# Patient Record
Sex: MALE | Race: WHITE | Employment: OTHER | URBAN - METROPOLITAN AREA
[De-identification: names, ages, dates, MRNs, and addresses within clinical notes are randomized per-mention and may not be internally consistent; named-entity substitution may affect disease eponyms.]

---

## 2017-06-03 PROBLEM — R07.9 CHEST PAIN: Status: ACTIVE | Noted: 2017-06-03

## 2017-11-14 ENCOUNTER — OFFICE VISIT (OUTPATIENT)
Dept: CARDIOLOGY CLINIC | Age: 82
End: 2017-11-14

## 2017-11-14 VITALS
WEIGHT: 219.4 LBS | DIASTOLIC BLOOD PRESSURE: 62 MMHG | HEART RATE: 68 BPM | HEIGHT: 65 IN | BODY MASS INDEX: 36.55 KG/M2 | SYSTOLIC BLOOD PRESSURE: 108 MMHG | OXYGEN SATURATION: 98 %

## 2017-11-14 DIAGNOSIS — I25.10 CORONARY ARTERY DISEASE INVOLVING NATIVE CORONARY ARTERY OF NATIVE HEART, ANGINA PRESENCE UNSPECIFIED: Primary | ICD-10-CM

## 2017-11-14 DIAGNOSIS — I10 ESSENTIAL HYPERTENSION: ICD-10-CM

## 2017-11-14 DIAGNOSIS — I51.89 DIASTOLIC DYSFUNCTION: ICD-10-CM

## 2017-11-14 PROCEDURE — G8484 FLU IMMUNIZE NO ADMIN: HCPCS | Performed by: INTERNAL MEDICINE

## 2017-11-14 PROCEDURE — G8598 ASA/ANTIPLAT THER USED: HCPCS | Performed by: INTERNAL MEDICINE

## 2017-11-14 PROCEDURE — G8417 CALC BMI ABV UP PARAM F/U: HCPCS | Performed by: INTERNAL MEDICINE

## 2017-11-14 PROCEDURE — 1123F ACP DISCUSS/DSCN MKR DOCD: CPT | Performed by: INTERNAL MEDICINE

## 2017-11-14 PROCEDURE — 1036F TOBACCO NON-USER: CPT | Performed by: INTERNAL MEDICINE

## 2017-11-14 PROCEDURE — 4040F PNEUMOC VAC/ADMIN/RCVD: CPT | Performed by: INTERNAL MEDICINE

## 2017-11-14 PROCEDURE — G8427 DOCREV CUR MEDS BY ELIG CLIN: HCPCS | Performed by: INTERNAL MEDICINE

## 2017-11-14 PROCEDURE — 99213 OFFICE O/P EST LOW 20 MIN: CPT | Performed by: INTERNAL MEDICINE

## 2017-11-14 RX ORDER — INSULIN GLARGINE 100 [IU]/ML
8 INJECTION, SOLUTION SUBCUTANEOUS NIGHTLY
COMMUNITY
End: 2020-01-01

## 2017-11-14 RX ORDER — FLUTICASONE PROPIONATE 50 MCG
1 SPRAY, SUSPENSION (ML) NASAL DAILY
COMMUNITY
End: 2020-01-01

## 2017-11-14 RX ORDER — BENZONATATE 100 MG/1
100 CAPSULE ORAL 3 TIMES DAILY PRN
COMMUNITY
End: 2019-06-19 | Stop reason: ALTCHOICE

## 2017-11-14 RX ORDER — CETIRIZINE HYDROCHLORIDE 10 MG/1
10 TABLET ORAL DAILY
COMMUNITY
End: 2020-01-01

## 2017-11-14 NOTE — PROGRESS NOTES
[address] [facility]   Cardiovascular follow up     PATIENT: Halie Guzman  DATE: 2017  MRN: [id number]  CSN: [id number]  : 1926      Primary Care Doctor: Nikunj Thompson MD  Reason for evaluation:   1 Year Follow Up and Shortness of Breath      Subjective:   History of present illness on initial date of evaluation:   Halie Guzman is a 80 y.o. patient who presents for hospital follow up. Recently presented to the ER on 03/10/2015 with complaints of fatigue. Pt with abdomainal distention and trouble breathing at time of admission. Patient underwent NM stress testing that showed mild ischemia with EF of 59% (low risk probability) and medically managing patient at that time . He did have one isolated incident of NSVT, his metoprolol was increased at that time to 25 mg twice daily. On follow up 4/7/15 he denied any further episodes of felt palpitations. Since that time he reported to having unchanged chest pressure in the mid sternal area that comes and goes. Chest pain was non exertional. Pain lasted only seconds and resolved quickly on its own. Remained greatly fatigued. He was currently residing in the [place] rehab facility. Family reported patient is not very active at all. Has had decreased appetite. He is unable to lie flat on his back. On 04/22/15 he is here for follow up s/p cardiac cath on 4/8/15. Cath showed nonobstructive CAD and Cardiac Syndrome X/Endothelial dysfunction. He comes to the office with his daughter in a wheelchair. He is tolerating his medications. He denies chest pain, shortness of breath, edema, dizziness, palpitations and syncope. Today he state she has been feeling well. He has daily weights that have been stabel. He states he had been sick, but is feeling well now. He had not been eating well. He is tolerating his medications. He denies CP, SOB, dizziness or syncope. He is npt active and uses a wheelchair. His lab work shows his A1c is 11.1.  Blood pressure recheck was 108/62. Patient Active Problem List   Diagnosis    CAD (coronary artery disease)    Weakness    HTN (hypertension)    Ataxia    Patient overweight    Benign prostatic hyperplasia with urinary obstruction    Insomnia    Anxiety    Chronic back pain    Hypotension    [facility]acute [facility] failure[facility])    Abdominal distension    SOB (shortness of breath)    NSVT (nonsustained ventricular tachycardia) ([facility])    Unstable angina ([facility])    Diastolic dysfunction    Chest pain         Past Medical History:   has a past medical history of Allergic rhinitis; Anxiety; BPH with obstruction/lower urinary tract symptoms; CAD (coronary artery disease); Chronic back pain; Diabetes mellitus ([address] GERD (gastroesophageal reflux disease); Hyperlipidemia; Hypertension; Lumbar disc disease; Lumbar spinal stenosis; Pneumonia; and Skin cancer. Surgical History:   has a past surgical history that includes knee surgery; shoulder surgery; Cataract removal; and other surgical history (Right, 10/25/2016). Social History:   reports that he has never smoked. He has never used smokeless tobacco. He reports that he does not drink alcohol or use drugs. Family History:  No evidence for sudden cardiac death or premature CAD    Home Medications:  Reviewed and are listed in nursing record.  and/or listed below  Current Outpatient Prescriptions   Medication Sig Dispense Refill    cetirizine (ZYRTEC) 10 MG tablet Take 10 mg by mouth daily      fluticasone (FLONASE) 50 MCG/ACT nasal spray 1 spray by Nasal route daily      insulin glargine (LANTUS) 100 UNIT/ML injection vial Inject into the skin nightly      insulin lispro (HUMALOG) 100 UNIT/ML injection cartridge Inject into the skin      ALBUTEROL SULFATE IN Inhale into the lungs      benzonatate (TESSALON) 100 MG capsule Take 100 mg by mouth 3 times daily as needed for Cough      ipratropium (ATROVENT) 0.02 % nebulizer solution Take 0.5 mg by nebulization 4 times daily      finasteride (PROSCAR) 5 MG tablet Take 1 tablet by mouth daily 30 tablet 0    Cyanocobalamin (VITAMIN B 12 PO) Take by mouth      glipiZIDE (GLUCOTROL) 5 MG tablet Take 5 mg by mouth 2 times daily (before meals)      metFORMIN (GLUCOPHAGE) 500 MG tablet Take 500 mg by mouth 2 times daily (with meals)       furosemide (LASIX) 40 MG tablet Take 1 tablet by mouth 2 times daily Take 40 mg in the am and 20 mg in the pm (Patient taking differently: Take 40 mg by mouth 2 times daily ) 60 tablet 3    metoprolol (LOPRESSOR) 25 MG tablet Take 0.5 tablets by mouth 2 times daily 60 tablet 3    potassium chloride SA (K-DUR;KLOR-CON M) 10 MEQ tablet Take 1 tablet by mouth daily (Patient taking differently: Take 20 mEq by mouth daily ) 60 tablet 11    isosorbide mononitrate (IMDUR) 30 MG CR tablet Take 2 tablets by mouth daily. 30 tablet 2    pravastatin (PRAVACHOL) 80 MG tablet Take 1 tablet by mouth daily. 30 tablet 3    polyethylene glycol (MIRALAX) powder Take 17 g by mouth daily. 238 g 0    aspirin 81 MG EC tablet Take 81 mg by mouth daily.  acetaminophen (TYLENOL) 325 MG tablet Take 650 mg by mouth every 6 hours as needed for Pain 2 tablets at bedtime      ranitidine (ZANTAC) 150 MG capsule Take 150 mg by mouth 2 times daily as needed       terazosin (HYTRIN) 10 MG capsule TAKE ONE CAPSULE BY MOUTH EVERY DAY 30 capsule 2    Multiple Vitamin (MULTIVITAMIN PO) Take  by mouth. Indications: OTC      lidocaine (LIDODERM) 5 % Place 1 patch onto the skin daily 12 hours on, 12 hours off. 10 patch 0    sennosides-docusate sodium (SENOKOT-S) 8.6-50 MG tablet Take 2 tablets by mouth daily 10 tablet 0     No current facility-administered medications for this visit. Allergies:  Penicillins     Review of Systems:   All 12 point review of symptoms completed. Pertinent positives identified in the HPI, all other review of symptoms negative as below.     Objective:   PHYSICAL EXAM: Vitals:    11/14/17 1023   BP: 92/62   Pulse: 68   SpO2: 98%    Weight: 219 lb 6.4 oz (99.5 kg)     Wt Readings from Last 3 Encounters:   11/14/17 219 lb 6.4 oz (99.5 kg)   06/04/17 223 lb 12.3 oz (101.5 kg)   06/03/17 200 lb (90.7 kg)         General Appearance:  Alert, cooperative, no distress, appears stated age, obese   Head:  Normocephalic, atraumatic   Eyes:  PERRL, conjunctiva/corneas clear   Nose: Nares normal, no drainage or sinus tenderness   Throat: Lips, mucosa, and tongue normal   Neck: Supple, symmetrical, trachea midline, NL thyroid no carotid bruit or JVD   Lungs:   Crackles in left lung to mid, respirations unlabored   Chest Wall:  No tenderness or deformity   Heart:  Regular rhythm and normal rate; S1, S2 are normal;   no murmur noted; no rub or gallop   Abdomen:   Soft, non-tender, +BS x 4, no masses, no organomegaly   Extremities: Extremities normal, atraumatic, no cyanosis.  Trace -1+ BLE pitting edema (R>L)   Pulses: 2+ and symmetric   Skin: Skin color, texture, turgor normal, no rashes or lesions   Pysch: Normal mood and affect   Neurologic: Normal gross motor and sensory exam.         LABS   CBC:      Lab Results   Component Value Date    WBC 12.4 06/04/2017    RBC 3.89 06/04/2017    HGB 12.0 06/04/2017    HCT 36.5 06/04/2017    MCV 93.9 06/04/2017    RDW 13.6 06/04/2017     06/04/2017     CMP:  Lab Results   Component Value Date     06/04/2017    K 3.7 06/04/2017    CL 93 06/04/2017    CO2 26 06/04/2017    BUN 20 06/04/2017    CREATININE 1.2 06/04/2017    GFRAA >60 06/04/2017    GFRAA >60 05/07/2012    AGRATIO 1.4 06/03/2017    LABGLOM 57 06/04/2017    GLUCOSE 215 06/04/2017    PROT 6.1 06/04/2017    PROT 6.8 05/03/2012    CALCIUM 9.2 06/04/2017    BILITOT 0.6 06/04/2017    ALKPHOS 62 06/04/2017    AST 22 06/04/2017    ALT 41 06/04/2017     PT/INR:   No components found for: PTPATIENT,  PTINR  Liver:  No components found for: CHLPL  Lab Results   Component Value Date    ALT 41

## 2017-11-14 NOTE — PATIENT INSTRUCTIONS
Plan:  1. No changes at this time   2. Stay as active as possible   3. Hold lasix on days that you are sick or are not taking enough fluids in   4.  Follow up in 6 months with NP

## 2018-02-13 LAB
ALBUMIN SERPL-MCNC: NORMAL G/DL
ALP BLD-CCNC: NORMAL U/L
ALT SERPL-CCNC: NORMAL U/L
ANION GAP SERPL CALCULATED.3IONS-SCNC: NORMAL MMOL/L
AST SERPL-CCNC: NORMAL U/L
BASOPHILS ABSOLUTE: 0.1 /ΜL
BASOPHILS RELATIVE PERCENT: 0.4 %
BILIRUB SERPL-MCNC: NORMAL MG/DL (ref 0.1–1.4)
BUN BLDV-MCNC: 29 MG/DL
CALCIUM SERPL-MCNC: 9 MG/DL
CHLORIDE BLD-SCNC: 98 MMOL/L
CHOLESTEROL, TOTAL: 145 MG/DL
CHOLESTEROL/HDL RATIO: 2.2
CO2: 27 MMOL/L
CREAT SERPL-MCNC: 1.2 MG/DL
EOSINOPHILS ABSOLUTE: 0.3 /ΜL
EOSINOPHILS RELATIVE PERCENT: 2.1 %
GFR CALCULATED: 57
GLUCOSE BLD-MCNC: 123 MG/DL
HCT VFR BLD CALC: 41.6 % (ref 41–53)
HDLC SERPL-MCNC: 34 MG/DL (ref 35–70)
HEMOGLOBIN: 13.5 G/DL (ref 13.5–17.5)
LDL CHOLESTEROL CALCULATED: 76 MG/DL (ref 0–160)
LYMPHOCYTES ABSOLUTE: 2.1 /ΜL
LYMPHOCYTES RELATIVE PERCENT: 14.5 %
MCH RBC QN AUTO: 29.8 PG
MCHC RBC AUTO-ENTMCNC: 32.6 G/DL
MCV RBC AUTO: 91.6 FL
MONOCYTES ABSOLUTE: 1.2 /ΜL
MONOCYTES RELATIVE PERCENT: 7.8 %
NEUTROPHILS ABSOLUTE: 11.1 /ΜL
NEUTROPHILS RELATIVE PERCENT: 75.2 %
PLATELET # BLD: 341 K/ΜL
PMV BLD AUTO: 8.1 FL
POTASSIUM SERPL-SCNC: NORMAL MMOL/L
RBC # BLD: 4.54 10^6/ΜL
SODIUM BLD-SCNC: 139 MMOL/L
TOTAL PROTEIN: NORMAL
TRIGL SERPL-MCNC: 175 MG/DL
VLDLC SERPL CALC-MCNC: 35 MG/DL
WBC # BLD: 14.7 10^3/ML

## 2018-04-03 LAB
BUN BLDV-MCNC: 20 MG/DL
CALCIUM SERPL-MCNC: 8.9 MG/DL
CHLORIDE BLD-SCNC: 100 MMOL/L
CO2: 30 MMOL/L
CREAT SERPL-MCNC: 1.1 MG/DL
GFR CALCULATED: 63
GLUCOSE BLD-MCNC: 121 MG/DL
POTASSIUM SERPL-SCNC: 3.9 MMOL/L
SODIUM BLD-SCNC: 137 MMOL/L

## 2018-06-14 ENCOUNTER — OFFICE VISIT (OUTPATIENT)
Dept: CARDIOLOGY CLINIC | Age: 83
End: 2018-06-14

## 2018-06-14 VITALS
HEART RATE: 84 BPM | SYSTOLIC BLOOD PRESSURE: 138 MMHG | DIASTOLIC BLOOD PRESSURE: 77 MMHG | HEIGHT: 62 IN | RESPIRATION RATE: 16 BRPM | OXYGEN SATURATION: 94 %

## 2018-06-14 DIAGNOSIS — I25.10 CORONARY ARTERY DISEASE INVOLVING NATIVE CORONARY ARTERY OF NATIVE HEART WITHOUT ANGINA PECTORIS: Primary | ICD-10-CM

## 2018-06-14 DIAGNOSIS — I20.8 CARDIAC SYNDROME X (HCC): ICD-10-CM

## 2018-06-14 DIAGNOSIS — I50.32 CHRONIC DIASTOLIC CONGESTIVE HEART FAILURE (HCC): ICD-10-CM

## 2018-06-14 DIAGNOSIS — I10 ESSENTIAL HYPERTENSION: ICD-10-CM

## 2018-06-14 PROCEDURE — 1036F TOBACCO NON-USER: CPT | Performed by: NURSE PRACTITIONER

## 2018-06-14 PROCEDURE — G8417 CALC BMI ABV UP PARAM F/U: HCPCS | Performed by: NURSE PRACTITIONER

## 2018-06-14 PROCEDURE — G8598 ASA/ANTIPLAT THER USED: HCPCS | Performed by: NURSE PRACTITIONER

## 2018-06-14 PROCEDURE — 4040F PNEUMOC VAC/ADMIN/RCVD: CPT | Performed by: NURSE PRACTITIONER

## 2018-06-14 PROCEDURE — G8427 DOCREV CUR MEDS BY ELIG CLIN: HCPCS | Performed by: NURSE PRACTITIONER

## 2018-06-14 PROCEDURE — 1123F ACP DISCUSS/DSCN MKR DOCD: CPT | Performed by: NURSE PRACTITIONER

## 2018-06-14 PROCEDURE — 99213 OFFICE O/P EST LOW 20 MIN: CPT | Performed by: NURSE PRACTITIONER

## 2018-12-17 NOTE — PROGRESS NOTES
2 times daily (before meals)      furosemide (LASIX) 40 MG tablet Take 1 tablet by mouth 2 times daily Take 40 mg in the am and 20 mg in the pm (Patient taking differently: Take 40 mg by mouth 2 times daily ) 60 tablet 3    metoprolol (LOPRESSOR) 25 MG tablet Take 0.5 tablets by mouth 2 times daily 60 tablet 3    potassium chloride SA (K-DUR;KLOR-CON M) 10 MEQ tablet Take 1 tablet by mouth daily (Patient taking differently: Take 20 mEq by mouth daily ) 60 tablet 11    isosorbide mononitrate (IMDUR) 30 MG CR tablet Take 2 tablets by mouth daily. 30 tablet 2    pravastatin (PRAVACHOL) 80 MG tablet Take 1 tablet by mouth daily. 30 tablet 3    polyethylene glycol (MIRALAX) powder Take 17 g by mouth daily. 238 g 0    aspirin 81 MG EC tablet Take 81 mg by mouth daily.  acetaminophen (TYLENOL) 325 MG tablet Take 650 mg by mouth every 6 hours as needed for Pain 2 tablets at bedtime      ranitidine (ZANTAC) 150 MG capsule Take 150 mg by mouth 2 times daily as needed       terazosin (HYTRIN) 10 MG capsule TAKE ONE CAPSULE BY MOUTH EVERY DAY 30 capsule 2    Multiple Vitamin (MULTIVITAMIN PO) Take  by mouth. Indications: OTC      fluticasone (FLONASE) 50 MCG/ACT nasal spray 1 spray by Nasal route daily      ALBUTEROL SULFATE IN Inhale into the lungs      benzonatate (TESSALON) 100 MG capsule Take 100 mg by mouth 3 times daily as needed for Cough      ipratropium (ATROVENT) 0.02 % nebulizer solution Take 0.5 mg by nebulization 4 times daily      lidocaine (LIDODERM) 5 % Place 1 patch onto the skin daily 12 hours on, 12 hours off. 10 patch 0    sennosides-docusate sodium (SENOKOT-S) 8.6-50 MG tablet Take 2 tablets by mouth daily 10 tablet 0    metFORMIN (GLUCOPHAGE) 500 MG tablet Take 500 mg by mouth 2 times daily (with meals)        No current facility-administered medications for this visit. Allergies:  Penicillins     Review of Systems:   All 12 point review of symptoms completed.  Pertinent No components found for: PTPATIENT,  PTINR  Liver:  No components found for: CHLPL  Lab Results   Component Value Date    ALT 41 (H) 06/04/2017    AST 22 06/04/2017    ALKPHOS 62 06/04/2017    BILITOT 0.6 06/04/2017     Lab Results   Component Value Date    LABA1C 8.8 06/04/2017     Lipids:         Lab Results   Component Value Date    TRIG 175 02/13/2018    TRIG 180 02/09/2016    TRIG 187 10/22/2015            Lab Results   Component Value Date    HDL 34 (A) 02/13/2018    HDL 35 02/09/2016    HDL 33 (A) 10/22/2015            Lab Results   Component Value Date    LDLCALC 76 02/13/2018    [address] 02/09/2016    LDLCALC 69 10/22/2015            Lab Results   Component Value Date    LABVLDL 34 04/08/2015    LABVLDL 31 05/26/2011         CARDIAC DATA   EKG:  3/12/2015 Normal sinus rhythmNonspecific ST abnormalityWhen compared with ECG of 10-MAR-2015 00:51,No significant change was foundConfirmed by Ching Arenas MD, GERARDO ([id number]) on 3/12/2015 5:34:09 PM      ECHO/MUGA: 3/12/2015  Significant ectopy noted on this study limits test sensitivity. Difficult  study, limited images. Left ventricle size is normal. Mild concentric left ventricular   Hypertrophy is present. Global ejection fraction is normal and estimated from 55 % to 60 %. Unable to call wall motion due to incomplete visualization of the  endocardial walls. Diastolic filling parameters suggests grade I diastolic dysfunction . STRESS TEST: 3/13/2015  1. Mild reversibility in the inferoseptal wall and septal margin   of the apex is suggestive of mild ischemia in the appropriate   clinical setting. 2. Left ventricular ejection fraction is approximately 59%. CT Abd/pelvis  1. No acute findings in the abdomen or pelvis. 2. Thickening of the wall of the esophagus with a small hiatal    hernia suggests reflux esophagitis. 3. Coronary artery disease.       Cath: 4/8/15    Findings:   LM- luminals  LAD- mild disease <15%  LCX- mild disease <15%  RCA:

## 2018-12-18 ENCOUNTER — OFFICE VISIT (OUTPATIENT)
Dept: CARDIOLOGY CLINIC | Age: 83
End: 2018-12-18
Payer: MEDICARE

## 2018-12-18 VITALS
BODY MASS INDEX: 37.73 KG/M2 | WEIGHT: 205 LBS | HEART RATE: 72 BPM | OXYGEN SATURATION: 97 % | DIASTOLIC BLOOD PRESSURE: 80 MMHG | HEIGHT: 62 IN | SYSTOLIC BLOOD PRESSURE: 100 MMHG

## 2018-12-18 DIAGNOSIS — I50.32 CHRONIC DIASTOLIC CONGESTIVE HEART FAILURE (HCC): ICD-10-CM

## 2018-12-18 DIAGNOSIS — I25.10 CORONARY ARTERY DISEASE INVOLVING NATIVE CORONARY ARTERY OF NATIVE HEART WITHOUT ANGINA PECTORIS: Primary | ICD-10-CM

## 2018-12-18 DIAGNOSIS — R06.02 SOB (SHORTNESS OF BREATH): ICD-10-CM

## 2018-12-18 DIAGNOSIS — I10 ESSENTIAL HYPERTENSION: ICD-10-CM

## 2018-12-18 PROCEDURE — G8417 CALC BMI ABV UP PARAM F/U: HCPCS | Performed by: INTERNAL MEDICINE

## 2018-12-18 PROCEDURE — G8484 FLU IMMUNIZE NO ADMIN: HCPCS | Performed by: INTERNAL MEDICINE

## 2018-12-18 PROCEDURE — 1123F ACP DISCUSS/DSCN MKR DOCD: CPT | Performed by: INTERNAL MEDICINE

## 2018-12-18 PROCEDURE — 99214 OFFICE O/P EST MOD 30 MIN: CPT | Performed by: INTERNAL MEDICINE

## 2018-12-18 PROCEDURE — 1036F TOBACCO NON-USER: CPT | Performed by: INTERNAL MEDICINE

## 2018-12-18 PROCEDURE — G8427 DOCREV CUR MEDS BY ELIG CLIN: HCPCS | Performed by: INTERNAL MEDICINE

## 2018-12-18 PROCEDURE — 1101F PT FALLS ASSESS-DOCD LE1/YR: CPT | Performed by: INTERNAL MEDICINE

## 2018-12-18 PROCEDURE — G8598 ASA/ANTIPLAT THER USED: HCPCS | Performed by: INTERNAL MEDICINE

## 2018-12-18 PROCEDURE — 4040F PNEUMOC VAC/ADMIN/RCVD: CPT | Performed by: INTERNAL MEDICINE

## 2018-12-19 LAB — B-TYPE NATRIURETIC PEPTIDE: 76.1 PG/ML

## 2019-06-18 NOTE — PROGRESS NOTES
[address] [facility]   Cardiovascular follow up     PATIENT: Ezio Sanchez  DATE: 2019  MRN: [id number]  CSN: [id number]  : 1926      Primary Care Doctor: Arnulfo Denny MD  Reason for evaluation:   6 Month Follow-Up (no new cardiac complaints)      Subjective:   History of present illness on initial date of evaluation:   Ezio Sanchez is a 80 y.o. patient who presents for hospital follow up. Recently presented to the ER on 03/10/2015 with complaints of fatigue. Pt with abdomainal distention and trouble breathing at time of admission. Patient underwent NM stress testing that showed mild ischemia with EF of 59% (low risk probability) and medically managing patient at that time . He did have one isolated incident of NSVT, his metoprolol was increased at that time to 25 mg twice daily. On follow up 4/7/15 he denied any further episodes of felt palpitations. Since that time he reported to having unchanged chest pressure in the mid sternal area that comes and goes. Chest pain was non exertional. Pain lasted only seconds and resolved quickly on its own. Remained greatly fatigued. He was currently residing in the LTAC, located within [facility] rehab facility. Family reported patient is not very active at all. Has had decreased appetite. He is unable to lie flat on his back. On 04/22/15 he is here for follow up s/p cardiac cath on 4/8/15. Cath showed nonobstructive CAD and Cardiac Syndrome X/Endothelial dysfunction. He comes to the office with his daughter in a wheelchair. He is tolerating his medications. He denies chest pain, shortness of breath, edema, dizziness, palpitations and syncope. On last visit 18 He states his daily weights that have been stable. He reports weighing 205 lbs. He reports being more SOB and states when he lays down he feels SOB. He denies CP, dizziness or syncope. He is npt active and uses a wheelchair. His STNA is present with his medication list.  Lasix is twice daily (40mg). Today he reports feeling good. Nurse from the nursing home is present. He presents in a wheelchair. Patient denies chest pain, sob, palpitations, dizziness or syncope. Patient Active Problem List   Diagnosis    CAD (coronary artery disease)    Weakness    HTN (hypertension)    Ataxia    Patient overweight    Benign prostatic hyperplasia with urinary obstruction    Insomnia    Anxiety    Chronic back pain    Hypotension    [facility]    Abdominal distension    SOB (shortness of breath)    NSVT (nonsustained ventricular tachycardia) ([facility]    Unstable angina (HCC)    Diastolic dysfunction    Chest pain    Chronic diastolic congestive heart failure ([facility])         Past Medical History:   has a past medical history of Allergic rhinitis, Anxiety, BPH with obstruction/lower urinary tract symptoms, CAD (coronary artery disease), Chronic back pain, Diabetes mellitus ([address] GERD (gastroesophageal reflux disease), Hyperlipidemia, Hypertension, Lumbar disc disease, Lumbar spinal stenosis, Pneumonia, and Skin cancer. Surgical History:   has a past surgical history that includes knee surgery; shoulder surgery; Cataract removal; and other surgical history (Right, 10/25/2016). Social History:   reports that he has never smoked. He has never used smokeless tobacco. He reports that he does not drink alcohol or use drugs. Family History:  No evidence for sudden cardiac death or premature CAD    Home Medications:  Reviewed and are listed in nursing record.  and/or listed below  Current Outpatient Medications   Medication Sig Dispense Refill    Nutritional Supplements (GLUCERNA PO) Take 8 oz by mouth 2 times daily      cetirizine (ZYRTEC) 10 MG tablet Take 10 mg by mouth daily      fluticasone (FLONASE) 50 MCG/ACT nasal spray 1 spray by Nasal route daily      insulin glargine (LANTUS) 100 UNIT/ML injection vial Inject 8 Units into the skin nightly       insulin lispro (HUMALOG) 100 UNIT/ML injection cartridge Inject into the skin      ALBUTEROL SULFATE IN Inhale into the lungs      ipratropium (ATROVENT) 0.02 % nebulizer solution Take 0.5 mg by nebulization 4 times daily      lidocaine (LIDODERM) 5 % Place 1 patch onto the skin daily 12 hours on, 12 hours off. 10 patch 0    sennosides-docusate sodium (SENOKOT-S) 8.6-50 MG tablet Take 2 tablets by mouth daily 10 tablet 0    finasteride (PROSCAR) 5 MG tablet Take 1 tablet by mouth daily 30 tablet 0    Cyanocobalamin (VITAMIN B 12 PO) Take by mouth      glipiZIDE (GLUCOTROL) 5 MG tablet Take 5 mg by mouth 2 times daily (before meals)      metFORMIN (GLUCOPHAGE) 500 MG tablet Take 500 mg by mouth 2 times daily (with meals)       furosemide (LASIX) 40 MG tablet Take 1 tablet by mouth 2 times daily Take 40 mg in the am and 20 mg in the pm (Patient taking differently: Take 40 mg by mouth 2 times daily ) 60 tablet 3    metoprolol (LOPRESSOR) 25 MG tablet Take 0.5 tablets by mouth 2 times daily 60 tablet 3    potassium chloride SA (K-DUR;KLOR-CON M) 10 MEQ tablet Take 1 tablet by mouth daily (Patient taking differently: Take 20 mEq by mouth daily ) 60 tablet 11    isosorbide mononitrate (IMDUR) 30 MG CR tablet Take 2 tablets by mouth daily. 30 tablet 2    pravastatin (PRAVACHOL) 80 MG tablet Take 1 tablet by mouth daily. (Patient taking differently: Take 20 mg by mouth daily ) 30 tablet 3    polyethylene glycol (MIRALAX) powder Take 17 g by mouth daily. 238 g 0    aspirin 81 MG EC tablet Take 81 mg by mouth daily.  acetaminophen (TYLENOL) 325 MG tablet Take 650 mg by mouth every 6 hours as needed for Pain 2 tablets at bedtime      terazosin (HYTRIN) 10 MG capsule TAKE ONE CAPSULE BY MOUTH EVERY DAY 30 capsule 2    Multiple Vitamin (MULTIVITAMIN PO) Take  by mouth.  Indications: OTC      ranitidine (ZANTAC) 150 MG capsule Take 150 mg by mouth 2 times daily as needed        No current facility-administered medications for this visit. Allergies:  Penicillins     Review of Systems:   All 12 point review of symptoms completed. Pertinent positives identified in the HPI, all other review of symptoms negative as below. Objective:   PHYSICAL EXAM:    Vitals:    06/19/19 1230   BP: (!) 90/52   Pulse:    SpO2:     Weight: 215 lb 9.6 oz (97.8 kg)     Wt Readings from Last 3 Encounters:   06/19/19 215 lb 9.6 oz (97.8 kg)   12/18/18 205 lb (93 kg)   11/14/17 219 lb 6.4 oz (99.5 kg)         General Appearance:  Alert, cooperative, no distress, appears stated age, obese   Head:  Normocephalic, atraumatic   Eyes:  PERRL, conjunctiva/corneas clear   Nose: Nares normal, no drainage or sinus tenderness   Throat: Lips, mucosa, and tongue normal   Neck: Supple, symmetrical, trachea midline, NL thyroid no carotid bruit or JVD   Lungs:   Crackles in left lung to mid, respirations unlabored   Chest Wall:  No tenderness or deformity   Heart:  Regular rhythm and normal rate; S1, S2 are normal;   no murmur noted; no rub or gallop   Abdomen:   Soft, non-tender, +BS x 4, no masses, no organomegaly   Extremities: Extremities normal, atraumatic, no cyanosis.  Trace -1+ BLE pitting edema (R>L)   Pulses: 2+ and symmetric   Skin: Skin color, texture, turgor normal, no rashes or lesions   Pysch: Normal mood and affect   Neurologic: Normal gross motor and sensory exam.         LABS   CBC:      Lab Results   Component Value Date    WBC 14.7 02/13/2018    RBC 4.54 02/13/2018    HGB 13.5 02/13/2018    HCT 41.6 02/13/2018    MCV 91.6 02/13/2018    RDW 13.6 06/04/2017     02/13/2018     CMP:  Lab Results   Component Value Date     04/03/2018    K 3.9 04/03/2018     04/03/2018    CO2 30 04/03/2018    BUN 20 04/03/2018    CREATININE 1.1 04/03/2018    GFRAA >60 06/04/2017    GFRAA >60 05/07/2012    AGRATIO 1.4 06/03/2017    LABGLOM 63 04/03/2018    LABGLOM 57 06/04/2017    GLUCOSE 121 04/03/2018    PROT 6.1 06/04/2017    PROT 6.8 05/03/2012 CALCIUM 8.9 04/03/2018    BILITOT 0.6 06/04/2017    ALKPHOS 62 06/04/2017    AST 22 06/04/2017    ALT 41 06/04/2017     PT/INR:   No components found for: PTPATIENT,  PTINR  Liver:  No components found for: CHLPL  Lab Results   Component Value Date    ALT 41 (H) 06/04/2017    AST 22 06/04/2017    ALKPHOS 62 06/04/2017    BILITOT 0.6 06/04/2017     Lab Results   Component Value Date    LABA1C 8.8 06/04/2017     Lipids:         Lab Results   Component Value Date    TRIG 175 02/13/2018    TRIG 180 02/09/2016    TRIG 187 10/22/2015            Lab Results   Component Value Date    HDL 34 (A) 02/13/2018    HDL 35 02/09/2016    HDL 33 (A) 10/22/2015            Lab Results   Component Value Date    LDLCALC 76 02/13/2018    LDLCALC 93 02/09/2016    LDLCALC 69 10/22/2015            Lab Results   Component Value Date    LABVLDL 34 04/08/2015    LABVLDL 31 05/26/2011         CARDIAC DATA   EKG:  3/12/2015 Normal sinus rhythmNonspecific ST abnormalityWhen compared with ECG of 10-MAR-2015 00:51,No significant change was foundConfirmed by Ressie Dancer MD, STEPHEN ([id number]) on 3/12/2015 5:34:09 PM    ECHO/MUGA: 3/12/2015  Significant ectopy noted on this study limits test sensitivity. Difficult  study, limited images. Left ventricle size is normal. Mild concentric left ventricular   Hypertrophy is present. Global ejection fraction is normal and estimated from 55 % to 60 %. Unable to call wall motion due to incomplete visualization of the  endocardial walls. Diastolic filling parameters suggests grade I diastolic dysfunction . STRESS TEST: 3/13/2015  1. Mild reversibility in the inferoseptal wall and septal margin   of the apex is suggestive of mild ischemia in the appropriate   clinical setting. 2. Left ventricular ejection fraction is approximately 59%. CT Abd/pelvis  1. No acute findings in the abdomen or pelvis. 2. Thickening of the wall of the esophagus with a small hiatal    hernia suggests reflux esophagitis.     3. Coronary artery disease. Cath: 4/8/15  Findings:   LM- luminals  LAD- mild disease <15%  LCX- mild disease <15%  RCA: dominant, large ectatic, LATOSHA-1-2 flow. Sluggish dye clearance  LVEDP 20-25  LVEF 65%  A&P  1. Nonobstructive CAD  2. Cardiac Syndrome X/Endothelial dysfunction- cont anti-anginal medicine, Increase Imdur to 60mg, aggressive cholesterol control. 3. If CP continued will consider starting L-argine  4. Elevated LVEDP: afterload/BP control, consider gentle diuretic as outpt. [place] ([facility] 12/18/18  No acute pulmonary disease. Chronic interstitial lung changes. Assessment and Plan   George Padgett is a 80 y.o. male who presents today for the following problems:        1. Chronic diastolic HF: appears euvolmic and compensated    - Cont Lasix 40mg BID   - NH to follow weights and symptoms, (reveiwed and at goal)    2. CAD: Nonobstructive <15% on recent cath. No CP   - ASA, lopressor, pravachol for CV risk reduction    3. Cardiac syndrome X/Endothelial dysfunction: Sluggish LATOSHA-I flow seen in RCA. - CP free on imdur 60mg po Qday   - prn NTG sublingual for breakthrough CP      4. Hypertension: controlled (slightly low today    5. SOB    Plan:  1. Continue current medications. No change  2. No cardiac testing warranted at this time  3. Continue to monitor BP. If continues to run low, call our office  4. Follow up with NP in 6 months     QUALITY MEASURES  1. Tobacco Cessation Counseling: NA  2. Retake of BP if >140/90:   NA  3. Documentation to PCP/referring for new patient:  Sent to PCP at close of office visit  4. CAD patient on anti-platelet: Yes  5. CAD patient on STATIN therapy:  Yes  6. Patient with CHF and aFib on anticoagulation:  NA     It is a pleasure to assist in the care of George Padgett. Please call with any questions. All questions and concerns were addressed to the patient/family. Alternatives to my treatment were discussed.      This note was scribed in the presence of  Burton VEGA by Tiny Lin, BRENTON. The scribes documentation has been prepared under my direction and personally reviewed by me in its entirety. I confirm that the note above accurately reflects all work, treatment, procedures, and medical decision making performed by me. I, Dr. Annabel Lucas MD, personally performed the services described in this documentation as scribed by [facility] K[facility] in my presence, and it is both accurate and complete to the best of our ability.          Annabel Lucas MD, [facility] Cardiologist  [address]  [phone] [facility]  [phone] [address] [place] [address]  6/19/2019  12:36 PM

## 2019-06-19 ENCOUNTER — OFFICE VISIT (OUTPATIENT)
Dept: CARDIOLOGY CLINIC | Age: 84
End: 2019-06-19
Payer: OTHER GOVERNMENT

## 2019-06-19 VITALS
WEIGHT: 215.6 LBS | DIASTOLIC BLOOD PRESSURE: 52 MMHG | HEIGHT: 62 IN | HEART RATE: 64 BPM | OXYGEN SATURATION: 95 % | SYSTOLIC BLOOD PRESSURE: 90 MMHG | BODY MASS INDEX: 39.67 KG/M2

## 2019-06-19 DIAGNOSIS — I50.32 CHRONIC DIASTOLIC CONGESTIVE HEART FAILURE (HCC): Primary | ICD-10-CM

## 2019-06-19 DIAGNOSIS — I25.89 CARDIAC MICROVASCULAR DISEASE: ICD-10-CM

## 2019-06-19 DIAGNOSIS — I10 ESSENTIAL HYPERTENSION: ICD-10-CM

## 2019-06-19 DIAGNOSIS — I25.10 CORONARY ARTERY DISEASE INVOLVING NATIVE CORONARY ARTERY OF NATIVE HEART WITHOUT ANGINA PECTORIS: ICD-10-CM

## 2019-06-19 PROCEDURE — 99213 OFFICE O/P EST LOW 20 MIN: CPT | Performed by: INTERNAL MEDICINE

## 2019-06-19 NOTE — PATIENT INSTRUCTIONS
Plan:  1. Continue current medications. No change  2. No cardiac testing warranted at this time  3. Continue to monitor BP. If continues to run low, call our office  4.  Follow up with NP in 6 months

## 2019-06-19 NOTE — LETTER
No current facility-administered medications for this visit. Allergies:  Penicillins     Review of Systems:   All 12 point review of symptoms completed. Pertinent positives identified in the HPI, all other review of symptoms negative as below. Objective:   PHYSICAL EXAM:    Vitals:    06/19/19 1230   BP: (!) 90/52   Pulse:    SpO2:     Weight: 215 lb 9.6 oz (97.8 kg)     Wt Readings from Last 3 Encounters:   06/19/19 215 lb 9.6 oz (97.8 kg)   12/18/18 205 lb (93 kg)   11/14/17 219 lb 6.4 oz (99.5 kg)         General Appearance:  Alert, cooperative, no distress, appears stated age, obese   Head:  Normocephalic, atraumatic   Eyes:  PERRL, conjunctiva/corneas clear   Nose: Nares normal, no drainage or sinus tenderness   Throat: Lips, mucosa, and tongue normal   Neck: Supple, symmetrical, trachea midline, NL thyroid no carotid bruit or JVD   Lungs:   Crackles in left lung to mid, respirations unlabored   Chest Wall:  No tenderness or deformity   Heart:  Regular rhythm and normal rate; S1, S2 are normal;   no murmur noted; no rub or gallop   Abdomen:   Soft, non-tender, +BS x 4, no masses, no organomegaly   Extremities: Extremities normal, atraumatic, no cyanosis.  Trace -1+ BLE pitting edema (R>L)   Pulses: 2+ and symmetric   Skin: Skin color, texture, turgor normal, no rashes or lesions   Pysch: Normal mood and affect   Neurologic: Normal gross motor and sensory exam.         LABS   CBC:      Lab Results   Component Value Date    WBC 14.7 02/13/2018    RBC 4.54 02/13/2018    HGB 13.5 02/13/2018    HCT 41.6 02/13/2018    MCV 91.6 02/13/2018    RDW 13.6 06/04/2017     02/13/2018     CMP:  Lab Results   Component Value Date     04/03/2018    K 3.9 04/03/2018     04/03/2018    CO2 30 04/03/2018    BUN 20 04/03/2018    CREATININE 1.1 04/03/2018    GFRAA >60 06/04/2017    GFRAA >60 05/07/2012    AGRATIO 1.4 06/03/2017    LABGLOM 63 04/03/2018    LABGLOM 57 06/04/2017    GLUCOSE 121 04/03/2018 PROT 6.1 06/04/2017    PROT 6.8 05/03/2012    CALCIUM 8.9 04/03/2018    BILITOT 0.6 06/04/2017    ALKPHOS 62 06/04/2017    AST 22 06/04/2017    ALT 41 06/04/2017     PT/INR:   No components found for: PTPATIENT,  PTINR  Liver:  No components found for: CHLPL  Lab Results   Component Value Date    ALT 41 (H) 06/04/2017    AST 22 06/04/2017    ALKPHOS 62 06/04/2017    BILITOT 0.6 06/04/2017     Lab Results   Component Value Date    LABA1C 8.8 06/04/2017     Lipids:         Lab Results   Component Value Date    TRIG 175 02/13/2018    TRIG 180 02/09/2016    TRIG 187 10/22/2015            Lab Results   Component Value Date    HDL 34 (A) 02/13/2018    HDL 35 02/09/2016    HDL 33 (A) 10/22/2015            Lab Results   Component Value Date    LDLCALC 76 02/13/2018    LDLCALC 93 02/09/2016    LDLCALC 69 10/22/2015            Lab Results   Component Value Date    LABVLDL 34 04/08/2015    LABVLDL 31 05/26/2011         CARDIAC DATA   EKG:  3/12/2015 Normal sinus rhythmNonspecific ST abnormalityWhen compared with ECG of 10-MAR-2015 00:51,No significant change was foundConfirmed by GERARDO Craven MD ([id number]) on 3/12/2015 5:34:09 PM    ECHO/MUGA: 3/12/2015  Significant ectopy noted on this study limits test sensitivity. Difficult  study, limited images. Left ventricle size is normal. Mild concentric left ventricular   Hypertrophy is present. Global ejection fraction is normal and estimated from 55 % to 60 %. Unable to call wall motion due to incomplete visualization of the  endocardial walls. Diastolic filling parameters suggests grade I diastolic dysfunction . STRESS TEST: 3/13/2015  1. Mild reversibility in the inferoseptal wall and septal margin   of the apex is suggestive of mild ischemia in the appropriate   clinical setting. 2. Left ventricular ejection fraction is approximately 59%. CT Abd/pelvis  1. No acute findings in the abdomen or pelvis. 2. Thickening of the wall of the esophagus with a small hiatal    hernia suggests reflux esophagitis. 3. Coronary artery disease. Cath: 4/8/15  Findings:   LM- luminals  LAD- mild disease <15%  LCX- mild disease <15%  RCA: dominant, large ectatic, LATOSHA-1-2 flow. Sluggish dye clearance  LVEDP 20-25  LVEF 65%  A&P  1. Nonobstructive CAD  2. Cardiac Syndrome X/Endothelial dysfunction- cont anti-anginal medicine, Increase Imdur to 60mg, aggressive cholesterol control. 3. If CP continued will consider starting L-argine  4. Elevated LVEDP: afterload/BP control, consider gentle diuretic as outpt. [place] ([facility]) 12/18/18  No acute pulmonary disease. Chronic interstitial lung changes. Assessment and Plan   Mihaela Ocampo is a 80 y.o. male who presents today for the following problems:        1. Chronic diastolic HF: appears euvolmic and compensated    - Cont Lasix 40mg BID   - NH to follow weights and symptoms, (reveiwed and at goal)    2. CAD: Nonobstructive <15% on recent cath. No CP   - ASA, lopressor, pravachol for CV risk reduction    3. Cardiac syndrome X/Endothelial dysfunction: Sluggish LATOSHA-I flow seen in RCA. - CP free on imdur 60mg po Qday   - prn NTG sublingual for breakthrough CP      4. Hypertension: controlled (slightly low today    5. SOB    Plan:  1. Continue current medications. No change  2. No cardiac testing warranted at this time  3. Continue to monitor BP. If continues to run low, call our office  4. Follow up with NP in 6 months     QUALITY MEASURES  1. Tobacco Cessation Counseling: NA  2. Retake of BP if >140/90:   NA  3. Documentation to PCP/referring for new patient:  Sent to PCP at close of office visit  4. CAD patient on anti-platelet: Yes  5. CAD patient on STATIN therapy:  Yes  6. Patient with CHF and aFib on anticoagulation:  NA     It is a pleasure to assist in the care of Mihaela Ocampo. Please call with any questions.  All questions and concerns were addressed to the patient/family. Alternatives to my treatment were discussed. This note was scribed in the presence of Dr. Beth Vang MD by Mariann Keita, RN. The scribes documentation has been prepared under my direction and personally reviewed by me in its entirety. I confirm that the note above accurately reflects all work, treatment, procedures, and medical decision making performed by me. I, Dr. Steve Lin MD, personally performed the services described in this documentation as scribed by Balaji Sweeney in my presence, and it is both accurate and complete to the best of our ability.          Steve Lin MD, [address] [facility] Cardiologist  [address]  [phone] [facility]  [phone] [address] [place] [address]  6/19/2019  12:36 PM

## 2019-12-17 ENCOUNTER — OFFICE VISIT (OUTPATIENT)
Dept: CARDIOLOGY CLINIC | Age: 84
End: 2019-12-17
Payer: MEDICARE

## 2019-12-17 VITALS
OXYGEN SATURATION: 98 % | WEIGHT: 215 LBS | HEIGHT: 62 IN | DIASTOLIC BLOOD PRESSURE: 61 MMHG | SYSTOLIC BLOOD PRESSURE: 109 MMHG | HEART RATE: 65 BPM | BODY MASS INDEX: 39.56 KG/M2

## 2019-12-17 DIAGNOSIS — I10 ESSENTIAL HYPERTENSION: ICD-10-CM

## 2019-12-17 DIAGNOSIS — I50.32 CHRONIC DIASTOLIC CONGESTIVE HEART FAILURE (HCC): Primary | ICD-10-CM

## 2019-12-17 DIAGNOSIS — I25.89 CARDIAC MICROVASCULAR DISEASE: ICD-10-CM

## 2019-12-17 DIAGNOSIS — I25.10 CORONARY ARTERY DISEASE INVOLVING NATIVE CORONARY ARTERY OF NATIVE HEART WITHOUT ANGINA PECTORIS: ICD-10-CM

## 2019-12-17 PROCEDURE — G8484 FLU IMMUNIZE NO ADMIN: HCPCS | Performed by: NURSE PRACTITIONER

## 2019-12-17 PROCEDURE — 1123F ACP DISCUSS/DSCN MKR DOCD: CPT | Performed by: NURSE PRACTITIONER

## 2019-12-17 PROCEDURE — 1036F TOBACCO NON-USER: CPT | Performed by: NURSE PRACTITIONER

## 2019-12-17 PROCEDURE — G8417 CALC BMI ABV UP PARAM F/U: HCPCS | Performed by: NURSE PRACTITIONER

## 2019-12-17 PROCEDURE — G8427 DOCREV CUR MEDS BY ELIG CLIN: HCPCS | Performed by: NURSE PRACTITIONER

## 2019-12-17 PROCEDURE — G8598 ASA/ANTIPLAT THER USED: HCPCS | Performed by: NURSE PRACTITIONER

## 2019-12-17 PROCEDURE — 99213 OFFICE O/P EST LOW 20 MIN: CPT | Performed by: NURSE PRACTITIONER

## 2019-12-17 PROCEDURE — 4040F PNEUMOC VAC/ADMIN/RCVD: CPT | Performed by: NURSE PRACTITIONER

## 2019-12-17 RX ORDER — ISOSORBIDE MONONITRATE 30 MG/1
30 TABLET, EXTENDED RELEASE ORAL DAILY
Qty: 30 TABLET | Refills: 2
Start: 2019-12-17

## 2019-12-17 RX ORDER — FUROSEMIDE 40 MG/1
40 TABLET ORAL 2 TIMES DAILY
Qty: 60 TABLET | Refills: 0 | Status: SHIPPED
Start: 2019-12-17

## 2019-12-17 RX ORDER — PRAVASTATIN SODIUM 20 MG
20 TABLET ORAL DAILY
Qty: 30 TABLET | Refills: 0 | Status: SHIPPED
Start: 2019-12-17

## 2019-12-17 RX ORDER — TAMSULOSIN HYDROCHLORIDE 0.4 MG/1
0.4 CAPSULE ORAL DAILY
COMMUNITY

## 2019-12-17 RX ORDER — POTASSIUM CHLORIDE 750 MG/1
20 TABLET, EXTENDED RELEASE ORAL DAILY
Qty: 30 TABLET | Refills: 0 | Status: SHIPPED
Start: 2019-12-17

## 2019-12-17 ASSESSMENT — ENCOUNTER SYMPTOMS: SHORTNESS OF BREATH: 1

## 2019-12-23 LAB
BUN BLDV-MCNC: 27 MG/DL
CALCIUM SERPL-MCNC: 8.7 MG/DL
CHLORIDE BLD-SCNC: 98 MMOL/L
CO2: 29 MMOL/L
CREAT SERPL-MCNC: 1.4 MG/DL
GFR CALCULATED: 47
GLUCOSE BLD-MCNC: 158 MG/DL
POTASSIUM SERPL-SCNC: 3.7 MMOL/L
SODIUM BLD-SCNC: 138 MMOL/L

## 2020-01-01 ENCOUNTER — TELEPHONE (OUTPATIENT)
Dept: CARDIOLOGY CLINIC | Age: 85
End: 2020-01-01

## 2020-01-01 ENCOUNTER — HOSPITAL ENCOUNTER (INPATIENT)
Age: 85
LOS: 3 days | DRG: 871 | End: 2020-12-27
Attending: EMERGENCY MEDICINE | Admitting: INTERNAL MEDICINE
Payer: MEDICARE

## 2020-01-01 ENCOUNTER — OFFICE VISIT (OUTPATIENT)
Dept: CARDIOLOGY CLINIC | Age: 85
End: 2020-01-01
Payer: MEDICARE

## 2020-01-01 ENCOUNTER — APPOINTMENT (OUTPATIENT)
Dept: GENERAL RADIOLOGY | Age: 85
DRG: 871 | End: 2020-01-01
Payer: MEDICARE

## 2020-01-01 ENCOUNTER — HOSPITAL ENCOUNTER (OUTPATIENT)
Age: 85
Setting detail: OUTPATIENT SURGERY
Discharge: NURSING, REHAB OR LONG-TERM CARE | End: 2020-11-13
Attending: UROLOGY | Admitting: UROLOGY
Payer: MEDICARE

## 2020-01-01 ENCOUNTER — ANESTHESIA (OUTPATIENT)
Dept: OPERATING ROOM | Age: 85
End: 2020-01-01
Payer: MEDICARE

## 2020-01-01 ENCOUNTER — ANESTHESIA EVENT (OUTPATIENT)
Dept: OPERATING ROOM | Age: 85
End: 2020-01-01
Payer: MEDICARE

## 2020-01-01 VITALS
DIASTOLIC BLOOD PRESSURE: 80 MMHG | WEIGHT: 187 LBS | OXYGEN SATURATION: 97 % | HEART RATE: 109 BPM | HEIGHT: 60 IN | BODY MASS INDEX: 36.71 KG/M2 | SYSTOLIC BLOOD PRESSURE: 110 MMHG

## 2020-01-01 VITALS
OXYGEN SATURATION: 100 % | RESPIRATION RATE: 5 BRPM | DIASTOLIC BLOOD PRESSURE: 55 MMHG | SYSTOLIC BLOOD PRESSURE: 90 MMHG | TEMPERATURE: 98.6 F

## 2020-01-01 VITALS
BODY MASS INDEX: 32.7 KG/M2 | RESPIRATION RATE: 16 BRPM | HEIGHT: 62 IN | TEMPERATURE: 96.9 F | WEIGHT: 177.7 LBS | SYSTOLIC BLOOD PRESSURE: 87 MMHG | HEART RATE: 74 BPM | DIASTOLIC BLOOD PRESSURE: 55 MMHG | OXYGEN SATURATION: 76 %

## 2020-01-01 VITALS
HEIGHT: 62 IN | HEART RATE: 126 BPM | SYSTOLIC BLOOD PRESSURE: 154 MMHG | RESPIRATION RATE: 16 BRPM | DIASTOLIC BLOOD PRESSURE: 97 MMHG | WEIGHT: 185 LBS | TEMPERATURE: 97.3 F | BODY MASS INDEX: 34.04 KG/M2 | OXYGEN SATURATION: 94 %

## 2020-01-01 DIAGNOSIS — U07.1 PNEUMONIA DUE TO COVID-19 VIRUS: Primary | ICD-10-CM

## 2020-01-01 DIAGNOSIS — A41.9 SEPTICEMIA (HCC): ICD-10-CM

## 2020-01-01 DIAGNOSIS — I48.91 ATRIAL FIBRILLATION WITH RAPID VENTRICULAR RESPONSE (HCC): ICD-10-CM

## 2020-01-01 DIAGNOSIS — E86.0 DEHYDRATION: ICD-10-CM

## 2020-01-01 DIAGNOSIS — J96.01 ACUTE RESPIRATORY FAILURE WITH HYPOXIA (HCC): ICD-10-CM

## 2020-01-01 DIAGNOSIS — J12.82 PNEUMONIA DUE TO COVID-19 VIRUS: Primary | ICD-10-CM

## 2020-01-01 LAB
A/G RATIO: 0.8 (ref 1.1–2.2)
A/G RATIO: 0.9 (ref 1.1–2.2)
A/G RATIO: 1.4
ABO/RH: NORMAL
ALBUMIN SERPL-MCNC: 3 G/DL (ref 3.4–5)
ALBUMIN SERPL-MCNC: 3.4 G/DL
ALBUMIN SERPL-MCNC: 3.5 G/DL (ref 3.4–5)
ALP BLD-CCNC: 119 U/L
ALP BLD-CCNC: 120 U/L (ref 40–129)
ALP BLD-CCNC: 134 U/L (ref 40–129)
ALT SERPL-CCNC: 106 U/L (ref 10–40)
ALT SERPL-CCNC: 109 U/L (ref 10–40)
ALT SERPL-CCNC: 13 U/L
ANION GAP SERPL CALCULATED.3IONS-SCNC: 10 MMOL/L (ref 3–16)
ANION GAP SERPL CALCULATED.3IONS-SCNC: 11 MMOL/L (ref 3–16)
ANION GAP SERPL CALCULATED.3IONS-SCNC: 15 MMOL/L (ref 3–16)
ANION GAP SERPL CALCULATED.3IONS-SCNC: 7 MMOL/L (ref 3–16)
ANISOCYTOSIS: ABNORMAL
ANTIBODY SCREEN: NORMAL
APTT: 32.4 SEC (ref 24.2–36.2)
AST SERPL-CCNC: 103 U/L (ref 15–37)
AST SERPL-CCNC: 11 U/L
AST SERPL-CCNC: 118 U/L (ref 15–37)
ATYPICAL LYMPHOCYTE RELATIVE PERCENT: 1 % (ref 0–6)
BASE EXCESS VENOUS: -2.6 MMOL/L (ref -3–3)
BASOPHILS ABSOLUTE: 0 K/UL (ref 0–0.2)
BASOPHILS ABSOLUTE: 0 K/UL (ref 0–0.2)
BASOPHILS ABSOLUTE: 0.1 K/UL (ref 0–0.2)
BASOPHILS RELATIVE PERCENT: 0 %
BASOPHILS RELATIVE PERCENT: 0.4 %
BASOPHILS RELATIVE PERCENT: 0.6 %
BILIRUB SERPL-MCNC: 0.4 MG/DL (ref 0.1–1.4)
BILIRUB SERPL-MCNC: 0.4 MG/DL (ref 0–1)
BILIRUB SERPL-MCNC: 0.6 MG/DL (ref 0–1)
BILIRUBIN DIRECT: 0.1 MG/DL
BILIRUBIN, INDIRECT: NORMAL
BLOOD BANK DISPENSE STATUS: NORMAL
BLOOD BANK PRODUCT CODE: NORMAL
BPU ID: NORMAL
BUN BLDV-MCNC: 14 MG/DL (ref 7–20)
BUN BLDV-MCNC: 22 MG/DL
BUN BLDV-MCNC: 36 MG/DL (ref 7–20)
BUN BLDV-MCNC: 37 MG/DL (ref 7–20)
BUN BLDV-MCNC: 42 MG/DL (ref 7–20)
CALCIUM SERPL-MCNC: 9.1 MG/DL (ref 8.3–10.6)
CALCIUM SERPL-MCNC: 9.2 MG/DL
CALCIUM SERPL-MCNC: 9.4 MG/DL (ref 8.3–10.6)
CALCIUM SERPL-MCNC: 9.5 MG/DL (ref 8.3–10.6)
CALCIUM SERPL-MCNC: 9.8 MG/DL (ref 8.3–10.6)
CARBOXYHEMOGLOBIN: 1.6 % (ref 0–1.5)
CHLORIDE BLD-SCNC: 103 MMOL/L (ref 99–110)
CHLORIDE BLD-SCNC: 107 MMOL/L (ref 99–110)
CHLORIDE BLD-SCNC: 109 MMOL/L (ref 99–110)
CHLORIDE BLD-SCNC: 112 MMOL/L (ref 99–110)
CHLORIDE BLD-SCNC: 98 MMOL/L
CO2: 19 MMOL/L (ref 21–32)
CO2: 24 MMOL/L (ref 21–32)
CO2: 25 MMOL/L (ref 21–32)
CO2: 30 MMOL/L
CO2: 30 MMOL/L (ref 21–32)
CREAT SERPL-MCNC: 0.9 MG/DL (ref 0.8–1.3)
CREAT SERPL-MCNC: 1 MG/DL (ref 0.8–1.3)
CREAT SERPL-MCNC: 1.1 MG/DL (ref 0.8–1.3)
CREAT SERPL-MCNC: 1.2 MG/DL
CREAT SERPL-MCNC: 1.2 MG/DL (ref 0.8–1.3)
D DIMER: 462 NG/ML DDU (ref 0–229)
DESCRIPTION BLOOD BANK: NORMAL
EKG ATRIAL RATE: 101 BPM
EKG ATRIAL RATE: 267 BPM
EKG DIAGNOSIS: NORMAL
EKG DIAGNOSIS: NORMAL
EKG Q-T INTERVAL: 384 MS
EKG Q-T INTERVAL: 424 MS
EKG QRS DURATION: 116 MS
EKG QRS DURATION: 86 MS
EKG QTC CALCULATION (BAZETT): 460 MS
EKG QTC CALCULATION (BAZETT): 567 MS
EKG R AXIS: 15 DEGREES
EKG R AXIS: 74 DEGREES
EKG T AXIS: 114 DEGREES
EKG T AXIS: 241 DEGREES
EKG VENTRICULAR RATE: 131 BPM
EKG VENTRICULAR RATE: 71 BPM
EOSINOPHILS ABSOLUTE: 0 K/UL (ref 0–0.6)
EOSINOPHILS RELATIVE PERCENT: 0 %
EOSINOPHILS RELATIVE PERCENT: 0 %
EOSINOPHILS RELATIVE PERCENT: 0.1 %
ESTIMATED AVERAGE GLUCOSE: 139.9 MG/DL
FERRITIN: 685 NG/ML (ref 30–400)
FIBRINOGEN: 432 MG/DL (ref 200–397)
GFR AFRICAN AMERICAN: >60
GFR CALCULATED: 57
GFR NON-AFRICAN AMERICAN: 56
GFR NON-AFRICAN AMERICAN: >60
GLOBULIN: 3.7 G/DL
GLOBULIN: 3.8 G/DL
GLOBULIN: NORMAL
GLUCOSE BLD-MCNC: 100 MG/DL (ref 70–99)
GLUCOSE BLD-MCNC: 100 MG/DL (ref 70–99)
GLUCOSE BLD-MCNC: 101 MG/DL (ref 70–99)
GLUCOSE BLD-MCNC: 103 MG/DL (ref 70–99)
GLUCOSE BLD-MCNC: 105 MG/DL (ref 70–99)
GLUCOSE BLD-MCNC: 112 MG/DL (ref 70–99)
GLUCOSE BLD-MCNC: 114 MG/DL (ref 70–99)
GLUCOSE BLD-MCNC: 122 MG/DL (ref 70–99)
GLUCOSE BLD-MCNC: 125 MG/DL (ref 70–99)
GLUCOSE BLD-MCNC: 130 MG/DL (ref 70–99)
GLUCOSE BLD-MCNC: 136 MG/DL (ref 70–99)
GLUCOSE BLD-MCNC: 136 MG/DL (ref 70–99)
GLUCOSE BLD-MCNC: 137 MG/DL (ref 70–99)
GLUCOSE BLD-MCNC: 143 MG/DL (ref 70–99)
GLUCOSE BLD-MCNC: 144 MG/DL (ref 70–99)
GLUCOSE BLD-MCNC: 174 MG/DL (ref 70–99)
GLUCOSE BLD-MCNC: 181 MG/DL (ref 70–99)
GLUCOSE BLD-MCNC: 202 MG/DL (ref 70–99)
GLUCOSE BLD-MCNC: 80 MG/DL
GLUCOSE BLD-MCNC: 86 MG/DL (ref 70–99)
GLUCOSE BLD-MCNC: 93 MG/DL (ref 70–99)
GLUCOSE BLD-MCNC: 94 MG/DL (ref 70–99)
GLUCOSE BLD-MCNC: 98 MG/DL (ref 70–99)
HBA1C MFR BLD: 6.5 %
HCO3 VENOUS: 24.3 MMOL/L (ref 23–29)
HCT VFR BLD CALC: 37.7 % (ref 41–53)
HCT VFR BLD CALC: 37.9 % (ref 40.5–52.5)
HCT VFR BLD CALC: 38.8 % (ref 40.5–52.5)
HCT VFR BLD CALC: 39.9 % (ref 40.5–52.5)
HCT VFR BLD CALC: 41.2 % (ref 40.5–52.5)
HEMOGLOBIN: 11.9 G/DL (ref 13.5–17.5)
HEMOGLOBIN: 12 G/DL (ref 13.5–17.5)
HEMOGLOBIN: 12.4 G/DL (ref 13.5–17.5)
HEMOGLOBIN: 12.6 G/DL (ref 13.5–17.5)
HEMOGLOBIN: 12.9 G/DL (ref 13.5–17.5)
HYPOCHROMIA: ABNORMAL
INR BLD: 2.55 (ref 0.86–1.14)
LACTATE DEHYDROGENASE: 339 U/L (ref 100–190)
LACTIC ACID, SEPSIS: 2.4 MMOL/L (ref 0.4–1.9)
LACTIC ACID, SEPSIS: 3.2 MMOL/L (ref 0.4–1.9)
LACTIC ACID: 1.4 MMOL/L (ref 0.4–2)
LACTIC ACID: 2.4 MMOL/L (ref 0.4–2)
LIPASE: 16 U/L (ref 13–60)
LYMPHOCYTES ABSOLUTE: 0.3 K/UL (ref 1–5.1)
LYMPHOCYTES ABSOLUTE: 0.4 K/UL (ref 1–5.1)
LYMPHOCYTES ABSOLUTE: 0.8 K/UL (ref 1–5.1)
LYMPHOCYTES RELATIVE PERCENT: 3.6 %
LYMPHOCYTES RELATIVE PERCENT: 5.4 %
LYMPHOCYTES RELATIVE PERCENT: 6 %
MCH RBC QN AUTO: 27 PG (ref 26–34)
MCH RBC QN AUTO: 27.5 PG (ref 26–34)
MCH RBC QN AUTO: 27.6 PG (ref 26–34)
MCH RBC QN AUTO: 27.9 PG (ref 26–34)
MCHC RBC AUTO-ENTMCNC: 30.7 G/DL (ref 31–36)
MCHC RBC AUTO-ENTMCNC: 31.3 G/DL (ref 31–36)
MCHC RBC AUTO-ENTMCNC: 31.5 G/DL (ref 31–36)
MCHC RBC AUTO-ENTMCNC: 31.7 G/DL (ref 31–36)
MCV RBC AUTO: 87 FL (ref 80–100)
MCV RBC AUTO: 88 FL (ref 80–100)
MCV RBC AUTO: 88 FL (ref 80–100)
MCV RBC AUTO: 88.5 FL (ref 80–100)
METHEMOGLOBIN VENOUS: 0.3 %
MONOCYTES ABSOLUTE: 0.3 K/UL (ref 0–1.3)
MONOCYTES ABSOLUTE: 0.5 K/UL (ref 0–1.3)
MONOCYTES ABSOLUTE: 0.6 K/UL (ref 0–1.3)
MONOCYTES RELATIVE PERCENT: 3.5 %
MONOCYTES RELATIVE PERCENT: 4 %
MONOCYTES RELATIVE PERCENT: 7.2 %
NEUTROPHILS ABSOLUTE: 10.8 K/UL (ref 1.7–7.7)
NEUTROPHILS ABSOLUTE: 6.8 K/UL (ref 1.7–7.7)
NEUTROPHILS ABSOLUTE: 8.5 K/UL (ref 1.7–7.7)
NEUTROPHILS RELATIVE PERCENT: 86.8 %
NEUTROPHILS RELATIVE PERCENT: 89 %
NEUTROPHILS RELATIVE PERCENT: 92.4 %
NUCLEATED RED BLOOD CELLS: 1 /100 WBC
O2 CONTENT, VEN: 17 VOL %
O2 SAT, VEN: 89 %
O2 THERAPY: ABNORMAL
PCO2, VEN: 50.5 MMHG (ref 40–50)
PDW BLD-RTO: 17.1 % (ref 12.4–15.4)
PDW BLD-RTO: 17.9 % (ref 12.4–15.4)
PDW BLD-RTO: 18 % (ref 12.4–15.4)
PDW BLD-RTO: 18.2 % (ref 12.4–15.4)
PERFORMED ON: ABNORMAL
PERFORMED ON: NORMAL
PH VENOUS: 7.3 (ref 7.35–7.45)
PLATELET # BLD: 309 K/UL (ref 135–450)
PLATELET # BLD: 339 K/UL (ref 135–450)
PLATELET # BLD: 349 K/UL (ref 135–450)
PLATELET # BLD: 366 K/UL (ref 135–450)
PLATELET # BLD: 473 K/ΜL
PLATELET SLIDE REVIEW: ADEQUATE
PMV BLD AUTO: 7.8 FL (ref 5–10.5)
PMV BLD AUTO: 8.2 FL (ref 5–10.5)
PMV BLD AUTO: 8.8 FL (ref 5–10.5)
PMV BLD AUTO: 8.8 FL (ref 5–10.5)
PO2, VEN: 61.3 MMHG (ref 25–40)
POIKILOCYTES: ABNORMAL
POTASSIUM REFLEX MAGNESIUM: 4.6 MMOL/L (ref 3.5–5.1)
POTASSIUM REFLEX MAGNESIUM: 4.7 MMOL/L (ref 3.5–5.1)
POTASSIUM REFLEX MAGNESIUM: 4.8 MMOL/L (ref 3.5–5.1)
POTASSIUM SERPL-SCNC: 3.7 MMOL/L (ref 3.5–5.1)
POTASSIUM SERPL-SCNC: 4.5 MMOL/L
PRO-BNP: ABNORMAL PG/ML (ref 0–449)
PROCALCITONIN: 0.1 NG/ML (ref 0–0.15)
PROTEIN TOTAL: 5.8 G/DL
PROTHROMBIN TIME: 29.9 SEC (ref 10–13.2)
RBC # BLD: 4.36 M/UL (ref 4.2–5.9)
RBC # BLD: 4.41 M/UL (ref 4.2–5.9)
RBC # BLD: 4.51 M/UL (ref 4.2–5.9)
RBC # BLD: 4.67 M/UL (ref 4.2–5.9)
SLIDE REVIEW: ABNORMAL
SODIUM BLD-SCNC: 139 MMOL/L
SODIUM BLD-SCNC: 140 MMOL/L (ref 136–145)
SODIUM BLD-SCNC: 143 MMOL/L (ref 136–145)
SODIUM BLD-SCNC: 143 MMOL/L (ref 136–145)
SODIUM BLD-SCNC: 146 MMOL/L (ref 136–145)
TCO2 CALC VENOUS: 26 MMOL/L
TOTAL PROTEIN: 6.7 G/DL (ref 6.4–8.2)
TOTAL PROTEIN: 7.3 G/DL (ref 6.4–8.2)
TROPONIN: 0.01 NG/ML
TROPONIN: 0.02 NG/ML
TROPONIN: 0.03 NG/ML
VANCOMYCIN RANDOM: 11.4 UG/ML
WBC # BLD: 12.1 K/UL (ref 4–11)
WBC # BLD: 7.8 K/UL (ref 4–11)
WBC # BLD: 8.1 K/UL (ref 4–11)
WBC # BLD: 9.2 K/UL (ref 4–11)
WBC # BLD: 9.4 10^3/ML

## 2020-01-01 PROCEDURE — 80048 BASIC METABOLIC PNL TOTAL CA: CPT

## 2020-01-01 PROCEDURE — 6360000002 HC RX W HCPCS

## 2020-01-01 PROCEDURE — 2580000003 HC RX 258: Performed by: INTERNAL MEDICINE

## 2020-01-01 PROCEDURE — 71045 X-RAY EXAM CHEST 1 VIEW: CPT

## 2020-01-01 PROCEDURE — 80053 COMPREHEN METABOLIC PANEL: CPT

## 2020-01-01 PROCEDURE — 2500000003 HC RX 250 WO HCPCS: Performed by: INTERNAL MEDICINE

## 2020-01-01 PROCEDURE — 6360000002 HC RX W HCPCS: Performed by: INTERNAL MEDICINE

## 2020-01-01 PROCEDURE — 99223 1ST HOSP IP/OBS HIGH 75: CPT | Performed by: INTERNAL MEDICINE

## 2020-01-01 PROCEDURE — 3700000001 HC ADD 15 MINUTES (ANESTHESIA): Performed by: UROLOGY

## 2020-01-01 PROCEDURE — 85384 FIBRINOGEN ACTIVITY: CPT

## 2020-01-01 PROCEDURE — 92610 EVALUATE SWALLOWING FUNCTION: CPT

## 2020-01-01 PROCEDURE — 82803 BLOOD GASES ANY COMBINATION: CPT

## 2020-01-01 PROCEDURE — 96365 THER/PROPH/DIAG IV INF INIT: CPT

## 2020-01-01 PROCEDURE — 99285 EMERGENCY DEPT VISIT HI MDM: CPT

## 2020-01-01 PROCEDURE — 99232 SBSQ HOSP IP/OBS MODERATE 35: CPT | Performed by: INTERNAL MEDICINE

## 2020-01-01 PROCEDURE — 96366 THER/PROPH/DIAG IV INF ADDON: CPT

## 2020-01-01 PROCEDURE — 93005 ELECTROCARDIOGRAM TRACING: CPT | Performed by: EMERGENCY MEDICINE

## 2020-01-01 PROCEDURE — 2700000000 HC OXYGEN THERAPY PER DAY

## 2020-01-01 PROCEDURE — 84145 PROCALCITONIN (PCT): CPT

## 2020-01-01 PROCEDURE — 94761 N-INVAS EAR/PLS OXIMETRY MLT: CPT

## 2020-01-01 PROCEDURE — 3600000004 HC SURGERY LEVEL 4 BASE: Performed by: UROLOGY

## 2020-01-01 PROCEDURE — 86900 BLOOD TYPING SEROLOGIC ABO: CPT

## 2020-01-01 PROCEDURE — 85025 COMPLETE CBC W/AUTO DIFF WBC: CPT

## 2020-01-01 PROCEDURE — 7100000011 HC PHASE II RECOVERY - ADDTL 15 MIN: Performed by: UROLOGY

## 2020-01-01 PROCEDURE — 4040F PNEUMOC VAC/ADMIN/RCVD: CPT | Performed by: INTERNAL MEDICINE

## 2020-01-01 PROCEDURE — 87040 BLOOD CULTURE FOR BACTERIA: CPT

## 2020-01-01 PROCEDURE — 2580000003 HC RX 258: Performed by: EMERGENCY MEDICINE

## 2020-01-01 PROCEDURE — XW033E5 INTRODUCTION OF REMDESIVIR ANTI-INFECTIVE INTO PERIPHERAL VEIN, PERCUTANEOUS APPROACH, NEW TECHNOLOGY GROUP 5: ICD-10-PCS | Performed by: INTERNAL MEDICINE

## 2020-01-01 PROCEDURE — 7100000000 HC PACU RECOVERY - FIRST 15 MIN: Performed by: UROLOGY

## 2020-01-01 PROCEDURE — 93000 ELECTROCARDIOGRAM COMPLETE: CPT | Performed by: INTERNAL MEDICINE

## 2020-01-01 PROCEDURE — 85610 PROTHROMBIN TIME: CPT

## 2020-01-01 PROCEDURE — 85027 COMPLETE CBC AUTOMATED: CPT

## 2020-01-01 PROCEDURE — 2060000000 HC ICU INTERMEDIATE R&B

## 2020-01-01 PROCEDURE — 83036 HEMOGLOBIN GLYCOSYLATED A1C: CPT

## 2020-01-01 PROCEDURE — 36415 COLL VENOUS BLD VENIPUNCTURE: CPT

## 2020-01-01 PROCEDURE — G8417 CALC BMI ABV UP PARAM F/U: HCPCS | Performed by: INTERNAL MEDICINE

## 2020-01-01 PROCEDURE — 2500000003 HC RX 250 WO HCPCS: Performed by: ANESTHESIOLOGY

## 2020-01-01 PROCEDURE — XW033N5 INTRODUCTION OF MEROPENEM-VABORBACTAM ANTI-INFECTIVE INTO PERIPHERAL VEIN, PERCUTANEOUS APPROACH, NEW TECHNOLOGY GROUP 5: ICD-10-PCS | Performed by: INTERNAL MEDICINE

## 2020-01-01 PROCEDURE — 6370000000 HC RX 637 (ALT 250 FOR IP): Performed by: INTERNAL MEDICINE

## 2020-01-01 PROCEDURE — 6360000002 HC RX W HCPCS: Performed by: EMERGENCY MEDICINE

## 2020-01-01 PROCEDURE — 83615 LACTATE (LD) (LDH) ENZYME: CPT

## 2020-01-01 PROCEDURE — 3700000000 HC ANESTHESIA ATTENDED CARE: Performed by: UROLOGY

## 2020-01-01 PROCEDURE — 84484 ASSAY OF TROPONIN QUANT: CPT

## 2020-01-01 PROCEDURE — 99233 SBSQ HOSP IP/OBS HIGH 50: CPT | Performed by: INTERNAL MEDICINE

## 2020-01-01 PROCEDURE — 86901 BLOOD TYPING SEROLOGIC RH(D): CPT

## 2020-01-01 PROCEDURE — 3600000014 HC SURGERY LEVEL 4 ADDTL 15MIN: Performed by: UROLOGY

## 2020-01-01 PROCEDURE — 80202 ASSAY OF VANCOMYCIN: CPT

## 2020-01-01 PROCEDURE — 7100000001 HC PACU RECOVERY - ADDTL 15 MIN: Performed by: UROLOGY

## 2020-01-01 PROCEDURE — 1123F ACP DISCUSS/DSCN MKR DOCD: CPT | Performed by: INTERNAL MEDICINE

## 2020-01-01 PROCEDURE — 85379 FIBRIN DEGRADATION QUANT: CPT

## 2020-01-01 PROCEDURE — 2500000003 HC RX 250 WO HCPCS: Performed by: EMERGENCY MEDICINE

## 2020-01-01 PROCEDURE — 7100000010 HC PHASE II RECOVERY - FIRST 15 MIN: Performed by: UROLOGY

## 2020-01-01 PROCEDURE — 2500000003 HC RX 250 WO HCPCS: Performed by: UROLOGY

## 2020-01-01 PROCEDURE — 1036F TOBACCO NON-USER: CPT | Performed by: INTERNAL MEDICINE

## 2020-01-01 PROCEDURE — G8427 DOCREV CUR MEDS BY ELIG CLIN: HCPCS | Performed by: INTERNAL MEDICINE

## 2020-01-01 PROCEDURE — 93010 ELECTROCARDIOGRAM REPORT: CPT | Performed by: INTERNAL MEDICINE

## 2020-01-01 PROCEDURE — 83690 ASSAY OF LIPASE: CPT

## 2020-01-01 PROCEDURE — 82728 ASSAY OF FERRITIN: CPT

## 2020-01-01 PROCEDURE — XW13325 TRANSFUSION OF CONVALESCENT PLASMA (NONAUTOLOGOUS) INTO PERIPHERAL VEIN, PERCUTANEOUS APPROACH, NEW TECHNOLOGY GROUP 5: ICD-10-PCS | Performed by: INTERNAL MEDICINE

## 2020-01-01 PROCEDURE — 86850 RBC ANTIBODY SCREEN: CPT

## 2020-01-01 PROCEDURE — 2709999900 HC NON-CHARGEABLE SUPPLY: Performed by: UROLOGY

## 2020-01-01 PROCEDURE — 96375 TX/PRO/DX INJ NEW DRUG ADDON: CPT

## 2020-01-01 PROCEDURE — 83880 ASSAY OF NATRIURETIC PEPTIDE: CPT

## 2020-01-01 PROCEDURE — 2580000003 HC RX 258: Performed by: UROLOGY

## 2020-01-01 PROCEDURE — 2500000003 HC RX 250 WO HCPCS

## 2020-01-01 PROCEDURE — 83605 ASSAY OF LACTIC ACID: CPT

## 2020-01-01 PROCEDURE — 85730 THROMBOPLASTIN TIME PARTIAL: CPT

## 2020-01-01 PROCEDURE — 93005 ELECTROCARDIOGRAM TRACING: CPT | Performed by: ANESTHESIOLOGY

## 2020-01-01 PROCEDURE — 99214 OFFICE O/P EST MOD 30 MIN: CPT | Performed by: INTERNAL MEDICINE

## 2020-01-01 PROCEDURE — 2580000003 HC RX 258: Performed by: ANESTHESIOLOGY

## 2020-01-01 RX ORDER — MIRTAZAPINE 7.5 MG/1
7.5 TABLET, FILM COATED ORAL NIGHTLY
COMMUNITY

## 2020-01-01 RX ORDER — LIDOCAINE HYDROCHLORIDE 20 MG/ML
INJECTION, SOLUTION INFILTRATION; PERINEURAL PRN
Status: DISCONTINUED | OUTPATIENT
Start: 2020-01-01 | End: 2020-01-01 | Stop reason: SDUPTHER

## 2020-01-01 RX ORDER — PRAVASTATIN SODIUM 20 MG
20 TABLET ORAL DAILY
Status: DISCONTINUED | OUTPATIENT
Start: 2020-01-01 | End: 2020-01-01 | Stop reason: HOSPADM

## 2020-01-01 RX ORDER — PROPOFOL 10 MG/ML
INJECTION, EMULSION INTRAVENOUS PRN
Status: DISCONTINUED | OUTPATIENT
Start: 2020-01-01 | End: 2020-01-01 | Stop reason: SDUPTHER

## 2020-01-01 RX ORDER — PROMETHAZINE HYDROCHLORIDE 25 MG/ML
6.25 INJECTION, SOLUTION INTRAMUSCULAR; INTRAVENOUS
Status: DISCONTINUED | OUTPATIENT
Start: 2020-01-01 | End: 2020-01-01 | Stop reason: HOSPADM

## 2020-01-01 RX ORDER — TAMSULOSIN HYDROCHLORIDE 0.4 MG/1
0.4 CAPSULE ORAL NIGHTLY
Status: DISCONTINUED | OUTPATIENT
Start: 2020-01-01 | End: 2020-01-01 | Stop reason: HOSPADM

## 2020-01-01 RX ORDER — ACETAMINOPHEN 325 MG/1
650 TABLET ORAL EVERY 6 HOURS PRN
Status: DISCONTINUED | OUTPATIENT
Start: 2020-01-01 | End: 2020-01-01 | Stop reason: HOSPADM

## 2020-01-01 RX ORDER — ALBUTEROL SULFATE 90 UG/1
2 AEROSOL, METERED RESPIRATORY (INHALATION) EVERY 4 HOURS PRN
Status: DISCONTINUED | OUTPATIENT
Start: 2020-01-01 | End: 2020-01-01 | Stop reason: HOSPADM

## 2020-01-01 RX ORDER — OXYCODONE HYDROCHLORIDE AND ACETAMINOPHEN 5; 325 MG/1; MG/1
1 TABLET ORAL PRN
Status: DISCONTINUED | OUTPATIENT
Start: 2020-01-01 | End: 2020-01-01 | Stop reason: HOSPADM

## 2020-01-01 RX ORDER — DIPHENHYDRAMINE HYDROCHLORIDE 50 MG/ML
12.5 INJECTION INTRAMUSCULAR; INTRAVENOUS
Status: DISCONTINUED | OUTPATIENT
Start: 2020-01-01 | End: 2020-01-01 | Stop reason: HOSPADM

## 2020-01-01 RX ORDER — SODIUM CHLORIDE 9 MG/ML
INJECTION, SOLUTION INTRAVENOUS PRN
Status: DISCONTINUED | OUTPATIENT
Start: 2020-01-01 | End: 2020-01-01 | Stop reason: HOSPADM

## 2020-01-01 RX ORDER — ROCURONIUM BROMIDE 10 MG/ML
INJECTION, SOLUTION INTRAVENOUS PRN
Status: DISCONTINUED | OUTPATIENT
Start: 2020-01-01 | End: 2020-01-01 | Stop reason: SDUPTHER

## 2020-01-01 RX ORDER — SODIUM CHLORIDE 0.9 % (FLUSH) 0.9 %
10 SYRINGE (ML) INJECTION PRN
Status: DISCONTINUED | OUTPATIENT
Start: 2020-01-01 | End: 2020-01-01 | Stop reason: HOSPADM

## 2020-01-01 RX ORDER — MORPHINE SULFATE 2 MG/ML
2 INJECTION, SOLUTION INTRAMUSCULAR; INTRAVENOUS EVERY 5 MIN PRN
Status: DISCONTINUED | OUTPATIENT
Start: 2020-01-01 | End: 2020-01-01 | Stop reason: HOSPADM

## 2020-01-01 RX ORDER — ACETAMINOPHEN 650 MG/1
650 SUPPOSITORY RECTAL EVERY 6 HOURS PRN
Status: DISCONTINUED | OUTPATIENT
Start: 2020-01-01 | End: 2020-01-01

## 2020-01-01 RX ORDER — ACETAMINOPHEN 650 MG/1
650 SUPPOSITORY RECTAL EVERY 6 HOURS PRN
Status: DISCONTINUED | OUTPATIENT
Start: 2020-01-01 | End: 2020-01-01 | Stop reason: HOSPADM

## 2020-01-01 RX ORDER — TRAMADOL HYDROCHLORIDE 50 MG/1
50 TABLET ORAL EVERY 6 HOURS PRN
Qty: 12 TABLET | Refills: 0 | Status: SHIPPED | OUTPATIENT
Start: 2020-01-01 | End: 2020-01-01

## 2020-01-01 RX ORDER — SERTRALINE HYDROCHLORIDE 25 MG/1
25 TABLET, FILM COATED ORAL DAILY
COMMUNITY

## 2020-01-01 RX ORDER — SODIUM CHLORIDE 0.9 % (FLUSH) 0.9 %
10 SYRINGE (ML) INJECTION EVERY 12 HOURS SCHEDULED
Status: DISCONTINUED | OUTPATIENT
Start: 2020-01-01 | End: 2020-01-01 | Stop reason: HOSPADM

## 2020-01-01 RX ORDER — POLYETHYLENE GLYCOL 3350 17 G/17G
17 POWDER, FOR SOLUTION ORAL DAILY
Status: DISCONTINUED | OUTPATIENT
Start: 2020-01-01 | End: 2020-01-01 | Stop reason: HOSPADM

## 2020-01-01 RX ORDER — MORPHINE SULFATE 2 MG/ML
1 INJECTION, SOLUTION INTRAMUSCULAR; INTRAVENOUS EVERY 5 MIN PRN
Status: DISCONTINUED | OUTPATIENT
Start: 2020-01-01 | End: 2020-01-01 | Stop reason: HOSPADM

## 2020-01-01 RX ORDER — DEXAMETHASONE SODIUM PHOSPHATE 10 MG/ML
6 INJECTION, SOLUTION INTRAMUSCULAR; INTRAVENOUS DAILY
Status: DISCONTINUED | OUTPATIENT
Start: 2020-01-01 | End: 2020-01-01 | Stop reason: HOSPADM

## 2020-01-01 RX ORDER — MIRTAZAPINE 15 MG/1
7.5 TABLET, FILM COATED ORAL NIGHTLY
Status: DISCONTINUED | OUTPATIENT
Start: 2020-01-01 | End: 2020-01-01 | Stop reason: HOSPADM

## 2020-01-01 RX ORDER — ONDANSETRON 2 MG/ML
4 INJECTION INTRAMUSCULAR; INTRAVENOUS PRN
Status: DISCONTINUED | OUTPATIENT
Start: 2020-01-01 | End: 2020-01-01 | Stop reason: HOSPADM

## 2020-01-01 RX ORDER — ONDANSETRON 2 MG/ML
INJECTION INTRAMUSCULAR; INTRAVENOUS PRN
Status: DISCONTINUED | OUTPATIENT
Start: 2020-01-01 | End: 2020-01-01 | Stop reason: SDUPTHER

## 2020-01-01 RX ORDER — LABETALOL HYDROCHLORIDE 5 MG/ML
5 INJECTION, SOLUTION INTRAVENOUS EVERY 10 MIN PRN
Status: DISCONTINUED | OUTPATIENT
Start: 2020-01-01 | End: 2020-01-01 | Stop reason: HOSPADM

## 2020-01-01 RX ORDER — SODIUM CHLORIDE 9 MG/ML
INJECTION, SOLUTION INTRAVENOUS CONTINUOUS
Status: DISCONTINUED | OUTPATIENT
Start: 2020-01-01 | End: 2020-01-01

## 2020-01-01 RX ORDER — VANCOMYCIN HYDROCHLORIDE 1 G/20ML
INJECTION, POWDER, LYOPHILIZED, FOR SOLUTION INTRAVENOUS PRN
Status: DISCONTINUED | OUTPATIENT
Start: 2020-01-01 | End: 2020-01-01 | Stop reason: SDUPTHER

## 2020-01-01 RX ORDER — ALBUTEROL SULFATE 90 UG/1
2 AEROSOL, METERED RESPIRATORY (INHALATION)
Status: DISCONTINUED | OUTPATIENT
Start: 2020-01-01 | End: 2020-01-01

## 2020-01-01 RX ORDER — MAGNESIUM HYDROXIDE 1200 MG/15ML
LIQUID ORAL PRN
Status: DISCONTINUED | OUTPATIENT
Start: 2020-01-01 | End: 2020-01-01 | Stop reason: ALTCHOICE

## 2020-01-01 RX ORDER — PANTOPRAZOLE SODIUM 40 MG/1
40 TABLET, DELAYED RELEASE ORAL NIGHTLY
Status: DISCONTINUED | OUTPATIENT
Start: 2020-01-01 | End: 2020-01-01 | Stop reason: HOSPADM

## 2020-01-01 RX ORDER — PHENYLEPHRINE HCL IN 0.9% NACL 1 MG/10 ML
SYRINGE (ML) INTRAVENOUS PRN
Status: DISCONTINUED | OUTPATIENT
Start: 2020-01-01 | End: 2020-01-01 | Stop reason: SDUPTHER

## 2020-01-01 RX ORDER — ISOSORBIDE MONONITRATE 30 MG/1
15 TABLET, EXTENDED RELEASE ORAL DAILY
Status: DISCONTINUED | OUTPATIENT
Start: 2020-01-01 | End: 2020-01-01 | Stop reason: HOSPADM

## 2020-01-01 RX ORDER — MEPERIDINE HYDROCHLORIDE 50 MG/ML
12.5 INJECTION INTRAMUSCULAR; INTRAVENOUS; SUBCUTANEOUS EVERY 5 MIN PRN
Status: DISCONTINUED | OUTPATIENT
Start: 2020-01-01 | End: 2020-01-01 | Stop reason: HOSPADM

## 2020-01-01 RX ORDER — DEXTROSE MONOHYDRATE 25 G/50ML
12.5 INJECTION, SOLUTION INTRAVENOUS PRN
Status: DISCONTINUED | OUTPATIENT
Start: 2020-01-01 | End: 2020-01-01 | Stop reason: HOSPADM

## 2020-01-01 RX ORDER — SODIUM CHLORIDE 9 MG/ML
INJECTION, SOLUTION INTRAVENOUS CONTINUOUS
Status: DISCONTINUED | OUTPATIENT
Start: 2020-01-01 | End: 2020-01-01 | Stop reason: HOSPADM

## 2020-01-01 RX ORDER — VITAMIN B COMPLEX
2000 TABLET ORAL DAILY
Status: DISCONTINUED | OUTPATIENT
Start: 2020-01-01 | End: 2020-01-01 | Stop reason: HOSPADM

## 2020-01-01 RX ORDER — DILTIAZEM HYDROCHLORIDE 5 MG/ML
0.25 INJECTION INTRAVENOUS ONCE
Status: COMPLETED | OUTPATIENT
Start: 2020-01-01 | End: 2020-01-01

## 2020-01-01 RX ORDER — 0.9 % SODIUM CHLORIDE 0.9 %
1000 INTRAVENOUS SOLUTION INTRAVENOUS ONCE
Status: COMPLETED | OUTPATIENT
Start: 2020-01-01 | End: 2020-01-01

## 2020-01-01 RX ORDER — NICOTINE POLACRILEX 4 MG
15 LOZENGE BUCCAL PRN
Status: DISCONTINUED | OUTPATIENT
Start: 2020-01-01 | End: 2020-01-01 | Stop reason: HOSPADM

## 2020-01-01 RX ORDER — SODIUM CHLORIDE, SODIUM LACTATE, POTASSIUM CHLORIDE, CALCIUM CHLORIDE 600; 310; 30; 20 MG/100ML; MG/100ML; MG/100ML; MG/100ML
INJECTION, SOLUTION INTRAVENOUS CONTINUOUS
Status: DISCONTINUED | OUTPATIENT
Start: 2020-01-01 | End: 2020-01-01 | Stop reason: HOSPADM

## 2020-01-01 RX ORDER — HYDRALAZINE HYDROCHLORIDE 20 MG/ML
5 INJECTION INTRAMUSCULAR; INTRAVENOUS EVERY 10 MIN PRN
Status: DISCONTINUED | OUTPATIENT
Start: 2020-01-01 | End: 2020-01-01 | Stop reason: HOSPADM

## 2020-01-01 RX ORDER — FUROSEMIDE 10 MG/ML
20 INJECTION INTRAMUSCULAR; INTRAVENOUS ONCE
Status: COMPLETED | OUTPATIENT
Start: 2020-01-01 | End: 2020-01-01

## 2020-01-01 RX ORDER — DEXAMETHASONE SODIUM PHOSPHATE 10 MG/ML
6 INJECTION, SOLUTION INTRAMUSCULAR; INTRAVENOUS ONCE
Status: COMPLETED | OUTPATIENT
Start: 2020-01-01 | End: 2020-01-01

## 2020-01-01 RX ORDER — LEVOFLOXACIN 5 MG/ML
500 INJECTION, SOLUTION INTRAVENOUS EVERY 24 HOURS
Status: DISCONTINUED | OUTPATIENT
Start: 2020-01-01 | End: 2020-01-01

## 2020-01-01 RX ORDER — SULFAMETHOXAZOLE AND TRIMETHOPRIM 400; 80 MG/1; MG/1
1 TABLET ORAL 2 TIMES DAILY
Qty: 10 TABLET | Refills: 0 | Status: SHIPPED | OUTPATIENT
Start: 2020-01-01 | End: 2020-01-01

## 2020-01-01 RX ORDER — OXYCODONE HYDROCHLORIDE AND ACETAMINOPHEN 5; 325 MG/1; MG/1
2 TABLET ORAL PRN
Status: DISCONTINUED | OUTPATIENT
Start: 2020-01-01 | End: 2020-01-01 | Stop reason: HOSPADM

## 2020-01-01 RX ORDER — ALBUTEROL SULFATE 90 UG/1
2 AEROSOL, METERED RESPIRATORY (INHALATION) EVERY 6 HOURS PRN
Status: DISCONTINUED | OUTPATIENT
Start: 2020-01-01 | End: 2020-01-01

## 2020-01-01 RX ORDER — CLONIDINE HYDROCHLORIDE 0.1 MG/1
0.1 TABLET ORAL EVERY 4 HOURS PRN
Status: DISCONTINUED | OUTPATIENT
Start: 2020-01-01 | End: 2020-01-01 | Stop reason: HOSPADM

## 2020-01-01 RX ORDER — GUAIFENESIN/DEXTROMETHORPHAN 100-10MG/5
5 SYRUP ORAL EVERY 4 HOURS PRN
Status: DISCONTINUED | OUTPATIENT
Start: 2020-01-01 | End: 2020-01-01 | Stop reason: HOSPADM

## 2020-01-01 RX ORDER — DEXAMETHASONE SODIUM PHOSPHATE 4 MG/ML
INJECTION, SOLUTION INTRA-ARTICULAR; INTRALESIONAL; INTRAMUSCULAR; INTRAVENOUS; SOFT TISSUE PRN
Status: DISCONTINUED | OUTPATIENT
Start: 2020-01-01 | End: 2020-01-01 | Stop reason: SDUPTHER

## 2020-01-01 RX ORDER — LIDOCAINE HYDROCHLORIDE 10 MG/ML
INJECTION, SOLUTION EPIDURAL; INFILTRATION; INTRACAUDAL; PERINEURAL PRN
Status: DISCONTINUED | OUTPATIENT
Start: 2020-01-01 | End: 2020-01-01 | Stop reason: ALTCHOICE

## 2020-01-01 RX ORDER — ACETAMINOPHEN 325 MG/1
650 TABLET ORAL EVERY 6 HOURS PRN
Status: DISCONTINUED | OUTPATIENT
Start: 2020-01-01 | End: 2020-01-01

## 2020-01-01 RX ORDER — DEXTROSE MONOHYDRATE 50 MG/ML
100 INJECTION, SOLUTION INTRAVENOUS PRN
Status: DISCONTINUED | OUTPATIENT
Start: 2020-01-01 | End: 2020-01-01 | Stop reason: HOSPADM

## 2020-01-01 RX ORDER — ASPIRIN 81 MG/1
81 TABLET ORAL DAILY
Status: DISCONTINUED | OUTPATIENT
Start: 2020-01-01 | End: 2020-01-01 | Stop reason: HOSPADM

## 2020-01-01 RX ADMIN — DILTIAZEM HYDROCHLORIDE 5 MG/HR: 5 INJECTION, SOLUTION INTRAVENOUS at 20:30

## 2020-01-01 RX ADMIN — ONDANSETRON 4 MG: 2 INJECTION INTRAMUSCULAR; INTRAVENOUS at 09:14

## 2020-01-01 RX ADMIN — LIDOCAINE HYDROCHLORIDE 100 MG: 20 INJECTION, SOLUTION INFILTRATION; PERINEURAL at 09:14

## 2020-01-01 RX ADMIN — DEXAMETHASONE SODIUM PHOSPHATE 8 MG: 4 INJECTION, SOLUTION INTRAMUSCULAR; INTRAVENOUS at 09:14

## 2020-01-01 RX ADMIN — VANCOMYCIN HYDROCHLORIDE 1500 MG: 1 INJECTION, POWDER, LYOPHILIZED, FOR SOLUTION INTRAVENOUS at 08:57

## 2020-01-01 RX ADMIN — Medication 10 ML: at 23:29

## 2020-01-01 RX ADMIN — DILTIAZEM HYDROCHLORIDE 5 MG/HR: 5 INJECTION, SOLUTION INTRAVENOUS at 05:48

## 2020-01-01 RX ADMIN — SODIUM CHLORIDE, POTASSIUM CHLORIDE, SODIUM LACTATE AND CALCIUM CHLORIDE: 600; 310; 30; 20 INJECTION, SOLUTION INTRAVENOUS at 07:47

## 2020-01-01 RX ADMIN — Medication 100 MCG: at 09:22

## 2020-01-01 RX ADMIN — DEXAMETHASONE SODIUM PHOSPHATE 6 MG: 10 INJECTION, SOLUTION INTRAMUSCULAR; INTRAVENOUS at 13:17

## 2020-01-01 RX ADMIN — PROPOFOL 100 MG: 10 INJECTION, EMULSION INTRAVENOUS at 09:14

## 2020-01-01 RX ADMIN — VANCOMYCIN HYDROCHLORIDE 1750 MG: 1 INJECTION, POWDER, LYOPHILIZED, FOR SOLUTION INTRAVENOUS at 18:11

## 2020-01-01 RX ADMIN — REMDESIVIR 100 MG: 100 INJECTION, POWDER, LYOPHILIZED, FOR SOLUTION INTRAVENOUS at 22:55

## 2020-01-01 RX ADMIN — DILTIAZEM HYDROCHLORIDE 10 MG/HR: 5 INJECTION, SOLUTION INTRAVENOUS at 11:00

## 2020-01-01 RX ADMIN — ROCURONIUM BROMIDE 50 MG: 10 SOLUTION INTRAVENOUS at 09:14

## 2020-01-01 RX ADMIN — DILTIAZEM HYDROCHLORIDE 10 MG/HR: 5 INJECTION, SOLUTION INTRAVENOUS at 23:22

## 2020-01-01 RX ADMIN — DOXYCYCLINE 100 MG: 100 INJECTION, POWDER, LYOPHILIZED, FOR SOLUTION INTRAVENOUS at 13:16

## 2020-01-01 RX ADMIN — Medication 10 ML: at 22:57

## 2020-01-01 RX ADMIN — FUROSEMIDE 20 MG: 10 INJECTION, SOLUTION INTRAMUSCULAR; INTRAVENOUS at 01:08

## 2020-01-01 RX ADMIN — SODIUM CHLORIDE: 9 INJECTION, SOLUTION INTRAVENOUS at 23:33

## 2020-01-01 RX ADMIN — MEROPENEM 1 G: 1 INJECTION, POWDER, FOR SOLUTION INTRAVENOUS at 18:07

## 2020-01-01 RX ADMIN — SUGAMMADEX 168 MG: 100 INJECTION, SOLUTION INTRAVENOUS at 09:43

## 2020-01-01 RX ADMIN — SODIUM CHLORIDE: 9 INJECTION, SOLUTION INTRAVENOUS at 13:15

## 2020-01-01 RX ADMIN — DILTIAZEM HYDROCHLORIDE 20.5 MG: 5 INJECTION INTRAVENOUS at 20:12

## 2020-01-01 RX ADMIN — DOXYCYCLINE 100 MG: 100 INJECTION, POWDER, LYOPHILIZED, FOR SOLUTION INTRAVENOUS at 02:17

## 2020-01-01 RX ADMIN — DEXAMETHASONE SODIUM PHOSPHATE 6 MG: 10 INJECTION, SOLUTION INTRAMUSCULAR; INTRAVENOUS at 18:10

## 2020-01-01 RX ADMIN — FAMOTIDINE 20 MG: 10 INJECTION, SOLUTION INTRAVENOUS at 07:48

## 2020-01-01 RX ADMIN — REMDESIVIR 200 MG: 100 INJECTION, POWDER, LYOPHILIZED, FOR SOLUTION INTRAVENOUS at 23:34

## 2020-01-01 RX ADMIN — SODIUM CHLORIDE: 9 INJECTION, SOLUTION INTRAVENOUS at 17:01

## 2020-01-01 RX ADMIN — MEROPENEM 1 G: 1 INJECTION, POWDER, FOR SOLUTION INTRAVENOUS at 05:30

## 2020-01-01 RX ADMIN — REMDESIVIR 100 MG: 100 INJECTION, POWDER, LYOPHILIZED, FOR SOLUTION INTRAVENOUS at 20:16

## 2020-01-01 RX ADMIN — DOXYCYCLINE 100 MG: 100 INJECTION, POWDER, LYOPHILIZED, FOR SOLUTION INTRAVENOUS at 15:37

## 2020-01-01 RX ADMIN — SODIUM CHLORIDE 1000 ML: 9 INJECTION, SOLUTION INTRAVENOUS at 18:06

## 2020-01-01 RX ADMIN — DOXYCYCLINE 100 MG: 100 INJECTION, POWDER, LYOPHILIZED, FOR SOLUTION INTRAVENOUS at 02:49

## 2020-01-01 RX ADMIN — DILTIAZEM HYDROCHLORIDE 5 MG/HR: 5 INJECTION, SOLUTION INTRAVENOUS at 09:03

## 2020-01-01 RX ADMIN — Medication 10 ML: at 20:15

## 2020-01-01 RX ADMIN — Medication 10 ML: at 10:03

## 2020-01-01 ASSESSMENT — ENCOUNTER SYMPTOMS: SHORTNESS OF BREATH: 1

## 2020-01-01 ASSESSMENT — PULMONARY FUNCTION TESTS
PIF_VALUE: 0
PIF_VALUE: 24
PIF_VALUE: 1
PIF_VALUE: 0
PIF_VALUE: 19
PIF_VALUE: 8
PIF_VALUE: 19
PIF_VALUE: 20
PIF_VALUE: 20
PIF_VALUE: 19
PIF_VALUE: 0
PIF_VALUE: 2
PIF_VALUE: 20
PIF_VALUE: 23
PIF_VALUE: 0
PIF_VALUE: 0
PIF_VALUE: 18
PIF_VALUE: 19
PIF_VALUE: 20
PIF_VALUE: 1
PIF_VALUE: 20
PIF_VALUE: 19
PIF_VALUE: 2
PIF_VALUE: 1
PIF_VALUE: 19
PIF_VALUE: 19
PIF_VALUE: 2
PIF_VALUE: 19
PIF_VALUE: 20
PIF_VALUE: 19
PIF_VALUE: 20
PIF_VALUE: 2
PIF_VALUE: 24
PIF_VALUE: 22
PIF_VALUE: 20
PIF_VALUE: 24
PIF_VALUE: 20
PIF_VALUE: 2
PIF_VALUE: 2
PIF_VALUE: 22
PIF_VALUE: 20
PIF_VALUE: 22
PIF_VALUE: 19

## 2020-01-01 ASSESSMENT — PAIN - FUNCTIONAL ASSESSMENT: PAIN_FUNCTIONAL_ASSESSMENT: 0-10

## 2020-08-12 NOTE — TELEPHONE ENCOUNTER
Received call from Dr. Wenceslao Gutierrez from [facility] where Mr. Macey Mckeon resides. Patient has procedure planned for placement of suprapubic catheter on 8/28/20. As part of pre-op work up an ECG was completed showing AFib which is new. Patient is asymptomatic with this. His HR have been controlled. CHADSsVASc score is 5 (chf, htn, age-2, dm), HAS-BLED score = 3 (5.8%). Patient is wheelchair bound/ non-ambulatory. He has decreasing mental alertness and  Dementia. Faxing ECG, recent labs. Checking on med list.  Metoprolol may have been discontinued due to hypotension. Discussed with Dr. Geoffrey Nichole:   1. If no hx of falls and is truly wheelchair bound (does not try to get up himself) then recommend anticoagulation. 2. If procedure is with general anesthesia then will need to see and evaluate prior to procedure. If not general anesthesia, acceptable moderate risk to proceed with SP catheter placement. 3. Recommend echocardiogram - preferably prior to procedure. 4. Arrange a follow [facility] appointment    Discussed with Dr. Wenceslao Gutierrez:   1. Patient not ambulatory, no falls. Will start on Eliquis. Hold Eliquis for 36-48 hours prior to procedure  2. Believes procedure will be with MAC or conscious sedation. 3. He will have Juan Kaufman CNP there work on getting echo arranged. If cannot do there, will look to have done at [facility]. 4. If Echo done at [facility] will attempt to schedule office visit there.         [facility]: [phone]  Dr. Wenceslao Gutierrez:  Bonilla Floyd CNP:  [address], :  [address] [facility], APRN - CNP, 8/12/2020, 4:59 PM

## 2020-08-13 NOTE — TELEPHONE ENCOUNTER
Noted. According to chart, the pt's echo was cancelled per the [facility] stating they will perform the echo at their facility.

## 2020-08-18 NOTE — TELEPHONE ENCOUNTER
Spoke with Dr. Atif Mensah. Plan is for general anesthesia for the suprapubic catheter placement. Echo completed 8/14/20 reviewed - EF 40-45%, aortic sclerosis, mild MR, mod LAE, mild YANI. He requires the suprapubic catheter due to recurrent bladder distention/ retention. He has had recurrent UTI's with subsequent sepsis requiring IV antibiotics. He has had rodriguez catheter for ~ 1 year, failed weaning trials. He has been started on Eliquis and seems to be tolerating okay per Dr. Atif Mensah. Patient has appointment with me next week. Will perform further physical exam/ assessment and make recommendations for upcoming surgery.     Caterina Cruz, SHAYLA - CNP

## 2020-09-22 NOTE — PROGRESS NOTES
[address] [facility]   Cardiovascular follow up     PATIENT: Shannon Escobedo  DATE: 2020  MRN: <[id number]>  CSN: [id number]  : 1926      Primary Care Doctor: Kaylen Fuentes MD  Reason for evaluation:   6 Month Follow-Up and Cardiac Clearance (Upcoming suprapubic surgery)      Subjective:   History of present illness on initial date of evaluation:   Shannon Escobedo is a 80 y.o. patient who presents for hospital follow up. Recently presented to the ER on 03/10/2015 with complaints of fatigue. Pt with abdomainal distention and trouble breathing at time of admission. Patient underwent NM stress testing that showed mild ischemia with EF of 59% (low risk probability) and medically managing patient at that time . He did have one isolated incident of NSVT, his metoprolol was increased at that time to 25 mg twice daily. On follow up 4/7/15 he denied any further episodes of felt palpitations. Since that time he reported to having unchanged chest pressure in the mid sternal area that comes and goes. Chest pain was non exertional. Pain lasted only seconds and resolved quickly on its own. Remained greatly fatigued. He was currently residing in the [place] rehab facility. Family reported patient is not very active at all. Has had decreased appetite. He is unable to lie flat on his back. On 04/22/15 he is here for follow up s/p cardiac cath on 4/8/15. Cath showed nonobstructive CAD and Cardiac Syndrome X/Endothelial dysfunction. He comes to the office with his daughter in a wheelchair. He is tolerating his medications. He denies chest pain, shortness of breath, edema, dizziness, palpitations and syncope. On last visit 18 He states his daily weights that have been stable. He reports weighing 205 lbs. He reports being more SOB and states when he lays down he feels SOB. He denies CP, dizziness or syncope. He is npt active and uses a wheelchair.   His STNA is present with his medication list. Lasix is twice daily (40mg). Today he presents for cardiac clearance for a supra pubic cath placement. He presents today with his nurse aid from the [facility]. He presents in a wheelchair. He has lost 35 lbs in 6 months time. Patient denies chest pain, sob, palpitations, dizziness or syncope. Patient Active Problem List   Diagnosis    CAD (coronary artery disease)    Weakness    HTN (hypertension)    Ataxia    Patient overweight    Benign prostatic hyperplasia with urinary obstruction    Insomnia    Anxiety    Chronic back pain    Hypotension    [facility]acute [facility] failure) ([facility])    Abdominal distension    SOB (shortness of breath)    NSVT (nonsustained ventricular tachycardia) ([facility])    Unstable angina ([facility])    Diastolic dysfunction    Chest pain    Chronic diastolic congestive heart failure ([facility])         Past Medical History:   has a past medical history of Allergic rhinitis, Anxiety, BPH with obstruction/lower urinary tract symptoms, CAD (coronary artery disease), Chronic back pain, Diabetes mellitus ([address] GERD (gastroesophageal reflux disease), Hyperlipidemia, Hypertension, Lumbar disc disease, Lumbar spinal stenosis, Pneumonia, and Skin cancer. Surgical History:   has a past surgical history that includes knee surgery; shoulder surgery; Cataract removal; and other surgical history (Right, 10/25/2016). Social History:   reports that he has never smoked. He has never used smokeless tobacco. He reports that he does not drink alcohol or use drugs. Family History:  No evidence for sudden cardiac death or premature CAD    Home Medications:  Reviewed and are listed in nursing record.  and/or listed below  Current Outpatient Medications   Medication Sig Dispense Refill    tamsulosin (FLOMAX) 0.4 MG capsule Take 0.4 mg by mouth daily      potassium chloride (KLOR-CON M) 10 MEQ extended release tablet Take 2 tablets by mouth daily 30 tablet 0    furosemide (LASIX) 40 MG tablet Take 1 tablet by mouth 2 times daily Take 40 mg in the am and 20 mg in the pm 60 tablet 0    pravastatin (PRAVACHOL) 20 MG tablet Take 1 tablet by mouth daily 30 tablet 0    isosorbide mononitrate (IMDUR) 30 MG extended release tablet Take 1 tablet by mouth daily 30 tablet 2    Nutritional Supplements (GLUCERNA PO) Take 8 oz by mouth 2 times daily      cetirizine (ZYRTEC) 10 MG tablet Take 10 mg by mouth daily      fluticasone (FLONASE) 50 MCG/ACT nasal spray 1 spray by Nasal route daily      insulin glargine (LANTUS) 100 UNIT/ML injection vial Inject 8 Units into the skin nightly       insulin lispro (HUMALOG) 100 UNIT/ML injection cartridge Inject into the skin      ALBUTEROL SULFATE IN Inhale into the lungs      ipratropium (ATROVENT) 0.02 % nebulizer solution Take 0.5 mg by nebulization 4 times daily      lidocaine (LIDODERM) 5 % Place 1 patch onto the skin daily 12 hours on, 12 hours off. 10 patch 0    sennosides-docusate sodium (SENOKOT-S) 8.6-50 MG tablet Take 2 tablets by mouth daily 10 tablet 0    finasteride (PROSCAR) 5 MG tablet Take 1 tablet by mouth daily 30 tablet 0    Cyanocobalamin (VITAMIN B 12 PO) Take by mouth      glipiZIDE (GLUCOTROL) 5 MG tablet Take 5 mg by mouth 2 times daily (before meals)      metFORMIN (GLUCOPHAGE) 500 MG tablet Take 500 mg by mouth 2 times daily (with meals)       metoprolol (LOPRESSOR) 25 MG tablet Take 0.5 tablets by mouth 2 times daily 60 tablet 3    polyethylene glycol (MIRALAX) powder Take 17 g by mouth daily. 238 g 0    acetaminophen (TYLENOL) 325 MG tablet Take 650 mg by mouth every 6 hours as needed for Pain 2 tablets at bedtime      ranitidine (ZANTAC) 150 MG capsule Take 150 mg by mouth 2 times daily as needed       terazosin (HYTRIN) 10 MG capsule TAKE ONE CAPSULE BY MOUTH EVERY DAY 30 capsule 2    Multiple Vitamin (MULTIVITAMIN PO) Take  by mouth. Indications: OTC      aspirin 81 MG EC tablet Take 81 mg by mouth daily.        No current facility-administered medications for this visit. Allergies:  Penicillins     Review of Systems:   All 12 point review of symptoms completed. Pertinent positives identified in the HPI, all other review of symptoms negative as below. Objective:   PHYSICAL EXAM:    Vitals:    09/23/20 1308   BP: 110/80   Pulse: 109   SpO2: 97%    Weight: 187 lb (84.8 kg)     Wt Readings from Last 3 Encounters:   09/23/20 187 lb (84.8 kg)   12/17/19 215 lb (97.5 kg)   06/19/19 215 lb 9.6 oz (97.8 kg)         General Appearance:  Alert, cooperative, no distress, appears stated age, obese, hard of hearing   Head:  Normocephalic, atraumatic   Eyes:  PERRL, conjunctiva/corneas clear   Nose: Nares normal, no drainage or sinus tenderness   Throat: Lips, mucosa, and tongue normal   Neck: Supple, symmetrical, trachea midline, NL thyroid no carotid bruit or JVD   Lungs:   Crackles in left lung to mid, respirations unlabored   Chest Wall:  No tenderness or deformity   Heart:  Regular rhythm and normal rate; S1, S2 are normal;   no murmur noted; no rub or gallop   Abdomen:   Soft, non-tender, +BS x 4, no masses, no organomegaly   Extremities: Extremities normal, atraumatic, no cyanosis.  Trace BLE edema   Pulses: 2+ and symmetric   Skin: Skin color, texture, turgor normal, no rashes or lesions   Pysch: Normal mood and affect   Neurologic: Normal gross motor and sensory exam.         LABS   CBC:      Lab Results   Component Value Date    WBC 9.4 07/21/2020    RBC 4.54 02/13/2018    HGB 12.4 07/21/2020    HCT 37.7 07/21/2020    MCV 91.6 02/13/2018    RDW 13.6 06/04/2017     07/21/2020     CMP:  Lab Results   Component Value Date     07/21/2020    K 4.5 07/21/2020    CL 98 07/21/2020    CO2 30 07/21/2020    BUN 22 07/21/2020    CREATININE 1.2 07/21/2020    GFRAA >60 06/04/2017    GFRAA >60 05/07/2012    AGRATIO 1.4 07/21/2020    LABGLOM 57 07/21/2020    LABGLOM 57 06/04/2017    GLUCOSE 80 07/21/2020    PROT 5.8 07/21/2020 CALCIUM 9.2 07/21/2020    BILITOT 0.4 07/21/2020    ALKPHOS 119 07/21/2020    AST 11 07/21/2020    ALT 13 07/21/2020     PT/INR:   No components found for: PTPATIENT,  PTINR  Liver:  No components found for: CHLPL  Lab Results   Component Value Date    ALT 13 07/21/2020    AST 11 07/21/2020    ALKPHOS 119 07/21/2020    BILITOT 0.4 07/21/2020     Lab Results   Component Value Date    LABA1C 8.8 06/04/2017     Lipids:         Lab Results   Component Value Date    TRIG 175 02/13/2018    TRIG 180 02/09/2016    TRIG 187 10/22/2015            Lab Results   Component Value Date    HDL 34 (A) 02/13/2018    HDL 35 02/09/2016    HDL 33 (A) 10/22/2015            Lab Results   Component Value Date    LDLCALC 76 02/13/2018    [address] 02/09/2016    LDLCALC 69 10/22/2015            Lab Results   Component Value Date    LABVLDL 34 04/08/2015    LABVLDL 31 05/26/2011         CARDIAC DATA   EKG:  3/12/2015 Normal sinus rhythmNonspecific ST abnormalityWhen compared with ECG of 10-MAR-2015 00:51,No significant change was foundConfirmed by Xiang Gonsalves MD, GERARDO ([id number]) on 3/12/2015 5:34:09 PM    ECHO/MUGA: 3/12/2015  Significant ectopy noted on this study limits test sensitivity. Difficult  study, limited images. Left ventricle size is normal. Mild concentric left ventricular   Hypertrophy is present. Global ejection fraction is normal and estimated from 55 % to 60 %. Unable to call wall motion due to incomplete visualization of the  endocardial walls. Diastolic filling parameters suggests grade I diastolic dysfunction . STRESS TEST: 3/13/2015  1. Mild reversibility in the inferoseptal wall and septal margin   of the apex is suggestive of mild ischemia in the appropriate   clinical setting. 2. Left ventricular ejection fraction is approximately 59%. CT Abd/pelvis  1. No acute findings in the abdomen or pelvis. 2. Thickening of the wall of the esophagus with a small hiatal    hernia suggests reflux esophagitis.     3.

## 2020-09-23 NOTE — PATIENT INSTRUCTIONS
Plan:  1. Increase the metoprolol (lopressor) to 25 mg twice a day  2. Continue all other medications  3.  May proceed with surgery at moderate risk

## 2020-09-23 NOTE — LETTER
[address] Cardiology - [address] [facility] [address] [facility]  Phone: [phone]  Fax: [phone]    Trey Werner MD        September 23, 2020     [address] [place] [address]    To Whom it May Concern:    Patient is moderate cardiovascular for non cardiac procedure. May proceed as planned. If you have any questions or concerns, please don't hesitate to call.     Sincerely,        Trey Werner MD

## 2020-10-08 NOTE — TELEPHONE ENCOUNTER
Pre admission calling to ask if pt should aspirin and Eliquis and for how long. Loan, from pre admission, said she was the clearance for surgery but no mention of medications. Please advise.

## 2020-11-13 NOTE — PROGRESS NOTES
D: pt placed in phase 2, VSS, son at bedside. A: Transport company called for , projected 2 hr wait time.

## 2020-11-13 NOTE — ANESTHESIA PRE PROCEDURE
Department of Anesthesiology  Preprocedure Note       Name:  Acosta Lee   Age:  80 y.o.  :  1926                                          MRN:  [id number]         Date:  2020      Surgeon: Jordin Bone):  Tarun Valiente MD    Procedure: Procedure(s):  CYSTOSCOPY, SUPRAPUBIC TUBE PLACEMENT    Medications prior to admission:   Prior to Admission medications    Medication Sig Start Date End Date Taking?  Authorizing Provider   mirtazapine (REMERON) 7.5 MG tablet Take 7.5 mg by mouth nightly   Yes Historical Provider, MD   omeprazole (PRILOSEC) 20 MG delayed release capsule Take 20 mg by mouth every evening   Yes Historical Provider, MD   metoprolol tartrate (LOPRESSOR) 25 MG tablet Take 1 tablet by mouth 2 times daily  Patient taking differently: Take 25 mg by mouth 2 times daily Take 1/2 tablet 2 x per day 20  Yes Leila Miles MD   tamsulosin (FLOMAX) 0.4 MG capsule Take 0.4 mg by mouth daily 2 capsules at bedtime   Yes Historical Provider, MD   potassium chloride (KLOR-CON M) 10 MEQ extended release tablet Take 2 tablets by mouth daily 19  Yes SHAYLA Garnica CNP   furosemide (LASIX) 40 MG tablet Take 1 tablet by mouth 2 times daily Take 40 mg in the am and 20 mg in the pm  Patient taking differently: Take 20 mg by mouth Twice a Week Tuesday and 19  Yes SHAYLA Miller CNP   sertraline (ZOLOFT) 25 MG tablet Take 25 mg by mouth daily    Historical Provider, MD   apixaban (ELIQUIS) 2.5 MG TABS tablet Take by mouth 2 times daily    Historical Provider, MD   Digestive Enzymes (ACIDOLL PO) Take by mouth    Historical Provider, MD   pravastatin (PRAVACHOL) 20 MG tablet Take 1 tablet by mouth daily 19   SHAYLA Garnica CNP   isosorbide mononitrate (IMDUR) 30 MG extended release tablet Take 1 tablet by mouth daily  Patient taking differently: Take 30 mg by mouth daily Take 1/2 tablet daily 19   SHAYLA Garnica CNP   Nutritional Supplements (GLUCERNA PO) Take 8 oz by mouth 2 times daily    Historical Provider, MD   metFORMIN (GLUCOPHAGE) 500 MG tablet Take 500 mg by mouth 2 times daily (with meals)     Historical Provider, MD   polyethylene glycol (MIRALAX) powder Take 17 g by mouth daily. 3/14/15   Karely Acosta MD   aspirin 81 MG EC tablet Take 81 mg by mouth daily. Historical Provider, MD   acetaminophen (TYLENOL) 325 MG tablet Take 650 mg by mouth every 6 hours as needed for Pain 2 tablets at bedtime    Historical Provider, MD       Current medications:    Current Facility-Administered Medications   Medication Dose Route Frequency Provider Last Rate Last Dose    lactated ringers infusion   Intravenous Continuous Carri Triplett  mL/hr at 11/13/20 0747      sodium chloride flush 0.9 % injection 10 mL  10 mL Intravenous 2 times per day Carri Triplett MD        sodium chloride flush 0.9 % injection 10 mL  10 mL Intravenous PRN Carri Triplett MD        vancomycin 1.5 g in dextrose 5% 300 mL IVPB  1,500 mg Intravenous Once Lucia López MD           Allergies:     Allergies   Allergen Reactions    Penicillins        Problem List:    Patient Active Problem List   Diagnosis Code    CAD (coronary artery disease) I25.10    Weakness R53.1    HTN (hypertension) I10    Ataxia R27.0    Patient overweight E66.3    Benign prostatic hyperplasia with urinary obstruction N40.1, N13.8    Insomnia G47.00    Anxiety F41.9    Chronic back pain M54.9, G89.29    Hypotension I95.9    ARF (acute renal failure) ([facility]) N17.9    Abdominal distension R14.0    SOB (shortness of breath) R06.02    NSVT (nonsustained ventricular tachycardia) ([facility]) I47.2    Unstable angina ([facility]) F55.3    Diastolic dysfunction K62.78    Chest pain R07.9    Chronic diastolic congestive heart failure ([facility]) I50.32       Past Medical History:        Diagnosis Date    AD (Alzheimer's disease) ([facility])     Allergic rhinitis     Anxiety     Ataxia     BPH with obstruction/lower urinary tract symptoms     CAD (coronary artery disease)     CHF (congestive heart failure) ([facility])     diastolic    Chronic back pain     CKD (chronic kidney disease)     Diabetes mellitus ([facility])     GERD (gastroesophageal reflux disease)     gerd    [place] (hard of hearing)     Hyperlipidemia     Hypertension     Insomnia     Lumbar disc disease     Lumbar spinal stenosis     mild    Pneumonia     Skin cancer     malignant       Past Surgical History:        Procedure Laterality Date    CATARACT REMOVAL      KNEE SURGERY      left    OTHER SURGICAL HISTORY Right 10/25/2016    excision of lesion lower lip    SHOULDER SURGERY      s/p fracture of right shoulder       Social History:    Social History     Tobacco Use    Smoking status: Never Smoker    Smokeless tobacco: Never Used   Substance Use Topics    Alcohol use: No     Alcohol/week: 0.0 standard drinks                                Counseling given: Not Answered      Vital Signs (Current):   Vitals:    11/05/20 1514 11/10/20 1033 11/13/20 0713   BP:   (!) 148/93   Pulse:   79   Resp:   14   Temp:   97.9 °F (36.6 °C)   TempSrc:   Temporal   SpO2:   95%   Weight: 188 lb (85.3 kg) 185 lb (83.9 kg)    Height: 5' 2\" (1.575 m) 5' 2\" (1.575 m)                                               BP Readings from Last 3 Encounters:   11/13/20 (!) 148/93   09/23/20 110/80   12/17/19 109/61       NPO Status: Time of last liquid consumption: 0000(verified with Mad[facility] at [facility])                        Time of last solid consumption: 0000                        Date of last liquid consumption: 11/12/20                        Date of last solid food consumption: 11/12/20    BMI:   Wt Readings from Last 3 Encounters:   11/10/20 185 lb (83.9 kg)   09/23/20 187 lb (84.8 kg)   12/17/19 215 lb (97.5 kg)     Body mass index is 33.84 kg/m².     CBC:   Lab Results   Component Value Date    WBC 8.1 11/13/2020    RBC 4.36 11/13/2020 HGB 12.0 11/13/2020    HCT 37.9 11/13/2020    MCV 87.0 11/13/2020    RDW 17.1 11/13/2020     11/13/2020       CMP:   Lab Results   Component Value Date     11/13/2020    K 4.8 11/13/2020     11/13/2020    CO2 30 11/13/2020    BUN 14 11/13/2020    CREATININE 0.9 11/13/2020    GFRAA >60 11/13/2020    GFRAA >60 05/07/2012    AGRATIO 1.4 07/21/2020    LABGLOM >60 11/13/2020    GLUCOSE 100 11/13/2020    PROT 5.8 07/21/2020    CALCIUM 9.5 11/13/2020    BILITOT 0.4 07/21/2020    ALKPHOS 119 07/21/2020    AST 11 07/21/2020    ALT 13 07/21/2020       POC Tests:   Recent Labs     11/13/20  0745   POCGLU 101*       Coags:   Lab Results   Component Value Date    PROTIME 11.4 06/03/2017    INR 0.98 06/03/2017    APTT 25.4 06/03/2017       HCG (If Applicable): No results found for: PREGTESTUR, PREGSERUM, HCG, HCGQUANT     ABGs: No results found for: PHART, PO2ART, FWY5UGW, HOF6WYL, BEART, U8OBUIRD     Type & Screen (If Applicable):  No results found for: LABABO, LABRH    Drug/Infectious Status (If Applicable):  No results found for: HIV, HEPCAB    COVID-19 Screening (If Applicable): No results found for: COVID19      Anesthesia Evaluation  Patient summary reviewed and Nursing notes reviewed  Airway: Mallampati: III  TM distance: >3 FB   Neck ROM: full  Mouth opening: > = 3 FB Dental:    (+) edentulous      Pulmonary:normal exam  breath sounds clear to auscultation  (+) pneumonia:  shortness of breath:                             Cardiovascular:    (+) hypertension:, angina:, CAD:, CHF:,         Rhythm: regular  Rate: normal                    Neuro/Psych:   (+) psychiatric history:             ROS comment: Advanced dementia GI/Hepatic/Renal:   (+) GERD:, renal disease: CRI,          ROS comment: BPH. Endo/Other:    (+) DiabetesType II DM, , .                 Abdominal:   (+) obese,         Vascular: negative vascular ROS.                                        Anesthesia Plan      general     ASA 3

## 2020-11-13 NOTE — PROGRESS NOTES
D: Pt brought to PACU. Report obtained from OR RN and anesthesia. Pt placed on monitor, oral airway present.

## 2020-11-13 NOTE — ANESTHESIA POSTPROCEDURE EVALUATION
Department of Anesthesiology  Postprocedure Note    Patient: Chetna Nair  MRN: [id number]  YOB: 1926  Date of evaluation: 11/13/2020  Time:  3:09 PM     Procedure Summary     Date:  11/13/20 Room / Location:  [address]    Anesthesia Start:  3450 Anesthesia Stop:  3468    Procedure:  CYSTOSCOPY, SUPRAPUBIC TUBE PLACEMENT (N/A Bladder) Diagnosis:       Retention of urine, unspecified      (RETENTION OF URINE)    Surgeon:  Chino Oliva MD Responsible Provider:  Muna Handy MD    Anesthesia Type:  general ASA Status:  3          Anesthesia Type: general    Travis Phase I: Travis Score: 9    Travis Phase II: Travis Score: 10    Last vitals: Reviewed and per EMR flowsheets. Anesthesia Post Evaluation    Patient location during evaluation: PACU  Patient participation: complete - patient participated  Level of consciousness: awake and alert  Airway patency: patent  Nausea & Vomiting: no nausea and no vomiting  Complications: no  Cardiovascular status: blood pressure returned to baseline  Respiratory status: acceptable  Hydration status: euvolemic  Comments: VSS on transfer to phase 2 recovery. No anesthetic complications.

## 2020-11-13 NOTE — PROGRESS NOTES
D: transportation team at bedside. A: report and dc instructions explained and given along with printed Rx. R: pt discharged to his facility.

## 2020-11-13 NOTE — BRIEF OP NOTE
Brief Postoperative Note      Patient: Hunter Elias  YOB: 1926  MRN: [id number]    Date of Procedure: 11/13/2020    Pre-Op Diagnosis: RETENTION OF URINE    Post-Op Diagnosis: Same       Procedure(s):  CYSTOSCOPY, SUPRAPUBIC TUBE PLACEMENT    Surgeon(s):  Jorge Ennis MD    Assistant:  * No surgical staff found *    Anesthesia: General    Estimated Blood Loss (mL): less than 50     Complications: None    Specimens:   * No specimens in log *    Implants:  * No implants in log *      Drains:   Closed/Suction Drain Distal;Midline Abdomen Other (Comment) 16 [place] (Active)   Dressing Status Clean 11/13/20 0954       Findings: erosion of distal urethra    Electronically signed by Jorge Ennis MD on 11/13/2020 at 9:55 AM

## 2020-12-24 PROBLEM — J96.21 ACUTE ON CHRONIC RESPIRATORY FAILURE WITH HYPOXIA (HCC): Status: ACTIVE | Noted: 2020-01-01

## 2020-12-24 NOTE — ED PROVIDER NOTES
[address]        Pt Name: Aramis Ortega  MRN: [id number]  [place] 11/14/1926  Date of evaluation: 12/24/2020  Provider: Kizzy Alexis MD  PCP: Radha Carl MD  ED Attending: Kizzy Alexis MD    [address] [place] [address]       Chief Complaint   Patient presents with    Positive For Covid-19     pt arrived via EMS from [place] in [facility], tested  [place] + on 12/14 and has worsening pneomonia, was 86% RA placed on non-rebreather and is @ 100% pt has worsening of COVID pneumonia and is congested t/o lung bases with non prod cough, pt has hx of CHF and COPD as well as Alzheimers disease       HISTORY OF PRESENT ILLNESS   (Location/Symptom, Timing/Onset, Context/Setting, Quality, Duration, Modifying Factors, Severity)  Note limiting factors. Aramis Ortega is a 80 y.o. male brought in by EMS from his nursing home with known COVID pneumonia. Patient has been requiring additional supplemental oxygen. He is not eating and drinking. His urine is getting darker and his chest xray is worsening. Staff there cannot get him to keep his oxygen on. He apparently has a history of Alzheimers dementia. He has a [facility] paperwork with him. History is obtained from EMS, nursing. REVIEW OF SYSTEMS    (2-9 systems for level 4, 10 or more for level 5)     Review of Systems   Unable to perform ROS: Dementia       Positives and Pertinent negatives as per HPI. Except as noted above in the ROS, all other systems were reviewed and negative.        PAST MEDICAL HISTORY     Past Medical History:   Diagnosis Date    AD (Alzheimer's disease) ([facility] [address]     Allergic rhinitis     Anxiety     Ataxia     BPH with obstruction/lower urinary tract symptoms     CAD (coronary artery disease)     CHF (congestive heart failure) (HCC)     diastolic    Chronic back pain     CKD (chronic kidney disease)     Diabetes mellitus (HCC)     GERD (gastroesophageal reflux disease)     gerd    [place] (hard of hearing)     Hyperlipidemia     Hypertension     Insomnia     Lumbar disc disease     Lumbar spinal stenosis     mild    Pneumonia     Skin cancer     malignant         SURGICAL HISTORY     Past Surgical History:   Procedure Laterality Date    CATARACT REMOVAL      CYSTOSCOPY N/A 11/13/2020    CYSTOSCOPY, SUPRAPUBIC TUBE PLACEMENT performed by Eduardo Salazar MD at [address]      left    OTHER SURGICAL HISTORY Right 10/25/2016    excision of lesion lower lip    SHOULDER SURGERY      s/p fracture of right shoulder         CURRENTMEDICATIONS       Current Discharge Medication List      CONTINUE these medications which have NOT CHANGED    Details   sertraline (ZOLOFT) 25 MG tablet Take 25 mg by mouth daily      mirtazapine (REMERON) 7.5 MG tablet Take 7.5 mg by mouth nightly      apixaban (ELIQUIS) 2.5 MG TABS tablet Take by mouth 2 times daily      Digestive Enzymes (ACIDOLL PO) Take by mouth      omeprazole (PRILOSEC) 20 MG delayed release capsule Take 20 mg by mouth every evening      metoprolol tartrate (LOPRESSOR) 25 MG tablet Take 1 tablet by mouth 2 times daily  Qty: 60 tablet, Refills: 3      tamsulosin (FLOMAX) 0.4 MG capsule Take 0.4 mg by mouth daily 2 capsules at bedtime      potassium chloride (KLOR-CON M) 10 MEQ extended release tablet Take 2 tablets by mouth daily  Qty: 30 tablet, Refills: 0      furosemide (LASIX) 40 MG tablet Take 1 tablet by mouth 2 times daily Take 40 mg in the am and 20 mg in the pm  Qty: 60 tablet, Refills: 0      pravastatin (PRAVACHOL) 20 MG tablet Take 1 tablet by mouth daily  Qty: 30 tablet, Refills: 0      isosorbide mononitrate (IMDUR) 30 MG extended release tablet Take 1 tablet by mouth daily  Qty: 30 tablet, Refills: 2      Nutritional Supplements (GLUCERNA PO) Take 8 oz by mouth 2 times daily      metFORMIN (GLUCOPHAGE) 500 MG tablet Take 500 mg by mouth 2 times daily (with meals)       polyethylene glycol (MIRALAX) powder Take 17 g by mouth daily.  Qty: 238 g, Refills: 0      aspirin 81 MG EC tablet Take 81 mg by mouth daily. acetaminophen (TYLENOL) 325 MG tablet Take 650 mg by mouth every 6 hours as needed for Pain 2 tablets at bedtime               ALLERGIES     Penicillins    FAMILYHISTORY       Family History   Problem Relation Age of Onset    Arthritis Mother     Diabetes Mother     Stroke Mother     Arthritis Father     Stroke Father           SOCIAL HISTORY       Social History     Socioeconomic History    Marital status:      Spouse name: None    Number of children: None    Years of education: None    Highest education level: None   Occupational History    None   Social Needs    Financial resource strain: None    Food insecurity     Worry: None     Inability: None    Transportation needs     Medical: None     Non-medical: None   Tobacco Use    Smoking status: Never Smoker    Smokeless tobacco: Never Used   Substance and Sexual Activity    Alcohol use: No     Alcohol/week: 0.0 standard drinks    Drug use: No    Sexual activity: Not Currently     Partners: Female   Lifestyle    Physical activity     Days per week: None     Minutes per session: None    Stress: None   Relationships    Social connections     Talks on phone: None     Gets together: None     Attends Jew service: None     Active member of club or organization: None     Attends meetings of clubs or organizations: None     Relationship status: None    Intimate partner violence     Fear of current or ex partner: None     Emotionally abused: None     Physically abused: None     Forced sexual activity: None   Other Topics Concern    None   Social History Narrative    None       SCREENINGS    [place] Coma Scale  Eye Opening: Spontaneous  Best Verbal Response: Confused  Best Motor Response:  Withdraws from pain  [place] Coma Scale Score: 12        PHYSICAL EXAM    (up to 7 for level 4, 8 or more for level 5)     ED Triage Vitals [12/24/20 1726]   BP Temp Temp Source Pulse Resp SpO2 Height Weight   (!) 145/100 98.1 °F (36.7 °C) Oral 155 28 100 % 5' 2\" (1.575 m) 180 lb (81.6 kg)       Physical Exam  Vitals signs and nursing note reviewed. Constitutional:       General: He is awake. He is not in acute distress. Appearance: He is ill-appearing. He is not toxic-appearing. Interventions: Face mask in place. Comments: Patient appears acutely on chronically ill. HENT:      Head: Normocephalic and atraumatic. Right Ear: External ear normal.      Left Ear: External ear normal.      Nose: Nose normal.      Mouth/Throat:      Pharynx: No oropharyngeal exudate. Eyes:      Conjunctiva/sclera: Conjunctivae normal.      Pupils: Pupils are equal, round, and reactive to light. Neck:      Musculoskeletal: Normal range of motion and neck supple. Cardiovascular:      Rate and Rhythm: Tachycardia present. Rhythm irregular. Heart sounds: Normal heart sounds. No murmur. No friction rub. No gallop. Pulmonary:      Effort: Pulmonary effort is normal. No accessory muscle usage, respiratory distress or retractions. Breath sounds: Decreased breath sounds present. No wheezing. Abdominal:      General: Bowel sounds are normal. There is no distension. Palpations: Abdomen is soft. Tenderness: There is no abdominal tenderness. There is no guarding or rebound. Musculoskeletal: Normal range of motion. General: No tenderness. Right lower leg: Edema present. Left lower leg: Edema present. Lymphadenopathy:      Cervical: No cervical adenopathy. Skin:     General: Skin is warm and dry. Capillary Refill: Capillary refill takes less than 2 seconds. Findings: No rash. Neurological:      GCS: GCS eye subscore is 4. GCS verbal subscore is 4. GCS motor subscore is 6. Cranial Nerves: No cranial nerve deficit or dysarthria. Psychiatric:         Behavior: Behavior is cooperative.          DIAGNOSTIC RESULTS mg/dL    GFR Non- 56 (A) >60    GFR African American >60 >60    Calcium 9.8 8.3 - 10.6 mg/dL    Total Protein 7.3 6.4 - 8.2 g/dL    Alb 3.5 3.4 - 5.0 g/dL    Albumin/Globulin Ratio 0.9 (L) 1.1 - 2.2    Total Bilirubin 0.6 0.0 - 1.0 mg/dL    Alkaline Phosphatase 134 (H) 40 - 129 U/L     (H) 10 - 40 U/L     (H) 15 - 37 U/L    Globulin 3.8 g/dL   Protime-INR   Result Value Ref Range    Protime 29.9 (H) 10.0 - 13.2 sec    INR 2.55 (H) 0.86 - 1.14   APTT   Result Value Ref Range    aPTT 32.4 24.2 - 36.2 sec   Lactate Dehydrogenase   Result Value Ref Range     (H) 100 - 190 U/L   D-Dimer, Quantitative   Result Value Ref Range    D-Dimer, Quant 462 (H) 0 - 229 ng/mL DDU   Lactic Acid, Plasma   Result Value Ref Range    Lactic Acid 2.4 (H) 0.4 - 2.0 mmol/L   Fibrinogen   Result Value Ref Range    Fibrinogen 432 (H) 200 - 397 mg/dL   Ferritin   Result Value Ref Range    Ferritin 685.0 (H) 30.0 - 400.0 ng/mL   Troponin   Result Value Ref Range    Troponin 0.03 (H) <0.01 ng/mL   Brain Natriuretic Peptide   Result Value Ref Range    Pro-BNP 29,841 (H) 0 - 449 pg/mL   Procalcitonin   Result Value Ref Range    Procalcitonin 0.10 0.00 - 0.15 ng/mL   Lactic Acid, Plasma   Result Value Ref Range    Lactic Acid 1.4 0.4 - 2.0 mmol/L   Troponin   Result Value Ref Range    Troponin 0.02 (H) <0.01 ng/mL   Troponin   Result Value Ref Range    Troponin 0.01 <0.01 ng/mL   Hemoglobin A1c   Result Value Ref Range    Hemoglobin A1C 6.5 See comment %    eAG 139.9 mg/dL   CBC auto differential   Result Value Ref Range    WBC 7.8 4.0 - 11.0 K/uL    RBC 4.51 4.20 - 5.90 M/uL    Hemoglobin 12.6 (L) 13.5 - 17.5 g/dL    Hematocrit 39.9 (L) 40.5 - 52.5 %    MCV 88.5 80.0 - 100.0 fL    MCH 27.9 26.0 - 34.0 pg    MCHC 31.5 31.0 - 36.0 g/dL    RDW 18.0 (H) 12.4 - 15.4 %    Platelets 382 198 - 020 K/uL    MPV 8.8 5.0 - 10.5 fL    Neutrophils % 86.8 %    Lymphocytes % 5.4 %    Monocytes % 7.2 %    Eosinophils % 0.0 % Basophils % 0.6 %    Neutrophils Absolute 6.8 1.7 - 7.7 K/uL    Lymphocytes Absolute 0.4 (L) 1.0 - 5.1 K/uL    Monocytes Absolute 0.6 0.0 - 1.3 K/uL    Eosinophils Absolute 0.0 0.0 - 0.6 K/uL    Basophils Absolute 0.1 0.0 - 0.2 K/uL   Vancomycin, random   Result Value Ref Range    Vancomycin Rm 11.4 ug/mL   Comprehensive Metabolic Panel w/ Reflex to MG   Result Value Ref Range    Sodium 143 136 - 145 mmol/L    Potassium reflex Magnesium 4.7 3.5 - 5.1 mmol/L    Chloride 109 99 - 110 mmol/L    CO2 19 (L) 21 - 32 mmol/L    Anion Gap 15 3 - 16    Glucose 137 (H) 70 - 99 mg/dL    BUN 37 (H) 7 - 20 mg/dL    CREATININE 1.1 0.8 - 1.3 mg/dL    GFR Non-African American >60 >60    GFR African American >60 >60    Calcium 9.1 8.3 - 10.6 mg/dL    Total Protein 6.7 6.4 - 8.2 g/dL    Alb 3.0 (L) 3.4 - 5.0 g/dL    Albumin/Globulin Ratio 0.8 (L) 1.1 - 2.2    Total Bilirubin 0.4 0.0 - 1.0 mg/dL    Alkaline Phosphatase 120 40 - 129 U/L     (H) 10 - 40 U/L     (H) 15 - 37 U/L    Globulin 3.7 g/dL   POCT Glucose   Result Value Ref Range    POC Glucose 130 (H) 70 - 99 mg/dl    Performed on [facility]    POCT Glucose   Result Value Ref Range    POC Glucose 144 (H) 70 - 99 mg/dl    Performed on [facility]    POCT Glucose   Result Value Ref Range    POC Glucose 86 70 - 99 mg/dl    Performed on [facility]    POCT Glucose   Result Value Ref Range    POC Glucose 93 70 - 99 mg/dl    Performed on [facility]    POCT Glucose   Result Value Ref Range    POC Glucose 103 (H) 70 - 99 mg/dl    Performed on [facility]    POCT Glucose   Result Value Ref Range    POC Glucose 98 70 - 99 mg/dl    Performed on [facility]    EKG 12 lead   Result Value Ref Range    Ventricular Rate 131 BPM    Atrial Rate 101 BPM    QRS Duration 116 ms    Q-T Interval 384 ms    QTc Calculation (Bazett) 567 ms    R Axis 74 degrees    T Axis 241 degrees    Diagnosis       Atrial fibrillationPremature ventricular complexesNonspecific ST abnormalityAbnormal ECGNo previous ECGs availableConfirmed by [facility] MD, [id number] ([id number]) on 12/25/2020 10:23:52 AM   TYPE AND SCREEN   Result Value Ref Range    ABO/Rh A POS     Antibody Screen NEG    Prepare COVID-19 Convalescent Plasma, 1 Units   Result Value Ref Range    Product Code Blood Bank [id number]     Description Blood Bank      Unit Number [id number]     Dispense Status Blood Bank transfused        All other labs were within normal range ornot returned as of this dictation. EKG: All EKG's are interpreted by the Emergency Department Physician who either signs or Co-signs this chart in the absence of a cardiologist.  Please see their note for interpretation of EKG. EKG Interpretation    Interpreted by emergency department physician    Rhythm: atrial fibrillation - controlled  Rate: normal  Axis: normal  Ectopy: premature ventricular contractions (infrequent)  Conduction: normal  ST Segments: nonspecific changes  T Waves: normal  Q Waves: none    Clinical Impression: atrial fibrillation, PVC, non-specific st changes. RADIOLOGY:   Non-plain film images such as CT, Ultrasound and MRI are read by the radiologist.  Plain radiographic images are visualized and preliminarily interpreted by the ED Provider with the belowfindings:    Interpretation per the Radiologist below, if available at the time of this note:    XR CHEST PORTABLE   Final Result   Dense bilateral pulmonary opacities relatively sparing the lung apices,   compatible with sequelae of COVID-19 pneumonia with the provided clinical   history. Probable small bilateral pleural effusions. PROCEDURES   Unless otherwise noted below, none     Procedures    CRITICAL CARE TIME   Total critical care time today provided was 46 minutes. This excludes seperately billable procedures and family discussion time.  Provided for acute sepsis, acute respiratory failure with hypoxia, acute atrial fibrillation with rapid ventricular response requiring intervention with concern for potential decompensation.       CONSULTS:  IP CONSULT TO HOSPITALIST  IP CONSULT TO PHARMACY  IP CONSULT TO PHARMACY  IP CONSULT TO PULMONOLOGY  IP CONSULT TO CARDIOLOGY      EMERGENCY DEPARTMENT COURSE and DIFFERENTIAL DIAGNOSIS/MDM:   Vitals:    Vitals:    12/25/20 2300 12/26/20 0100 12/26/20 0200 12/26/20 0245   BP:    124/78   Pulse: 118 109 122 109   Resp: 22 22 22 22   Temp:    96.4 °F (35.8 °C)   TempSrc:    Oral   SpO2:    91%   Weight:    170 lb 12.8 oz (77.5 kg)   Height:           Patient was given the following medications:  Medications   dilTIAZem injection 20.5 mg (20.5 mg Intravenous Given 12/24/20 2012)     Followed by   dilTIAZem 125 mg in dextrose 5 % 125 mL infusion (5 mg/hr Intravenous New Bag 12/25/20 0903)   0.9 % sodium chloride infusion (has no administration in time range)   aspirin EC tablet 81 mg (81 mg Oral Not Given 12/25/20 0859)   isosorbide mononitrate (IMDUR) extended release tablet 15 mg (15 mg Oral Not Given 12/25/20 0859)   mirtazapine (REMERON) tablet 7.5 mg (7.5 mg Oral Not Given 12/25/20 2016)   pantoprazole (PROTONIX) tablet 40 mg (40 mg Oral Not Given 12/25/20 2017)   pravastatin (PRAVACHOL) tablet 20 mg (20 mg Oral Not Given 12/25/20 0900)   polyethylene glycol (GLYCOLAX) packet 17 g (17 g Oral Not Given 12/25/20 0900)   sertraline (ZOLOFT) tablet 25 mg (25 mg Oral Not Given 12/25/20 0901)   tamsulosin (FLOMAX) capsule 0.4 mg (0.4 mg Oral Not Given 12/25/20 2017)   glucose (GLUTOSE) 40 % oral gel 15 g (has no administration in time range)   dextrose 50 % IV solution (has no administration in time range)   glucagon (rDNA) injection 1 mg (has no administration in time range)   dextrose 5 % solution (has no administration in time range)   sodium chloride flush 0.9 % injection 10 mL (10 mLs Intravenous Given 12/25/20 2015)   sodium chloride flush 0.9 % injection 10 mL (has no administration in time range)   acetaminophen (TYLENOL) tablet 650 mg (has no administration in time range)     Or   acetaminophen (TYLENOL) suppository 650 mg (has no administration in time range)   apixaban (ELIQUIS) tablet 2.5 mg (2.5 mg Oral Not Given 12/25/20 2016)   0.9 % sodium chloride infusion ( Intravenous New Bag 12/24/20 2333)   insulin lispro (HUMALOG) injection vial 0-6 Units (0 Units Subcutaneous Not Given 12/26/20 0040)   guaiFENesin-dextromethorphan (ROBITUSSIN DM) 100-10 MG/5ML syrup 5 mL (has no administration in time range)   dexamethasone (DECADRON) tablet 6 mg (6 mg Oral Not Given 12/25/20 0859)   Vitamin D (CHOLECALCIFEROL) tablet 2,000 Units (2,000 Units Oral Not Given 12/25/20 0901)   remdesivir 200 mg in sodium chloride 0.9 % 250 mL IVPB (0 mg Intravenous Stopped 12/25/20 0109)     Followed by   remdesivir 100 mg in sodium chloride 0.9 % 250 mL IVPB (0 mg Intravenous Stopped 12/25/20 2124)   albuterol-ipratropium (COMBIVENT RESPIMAT)  MCG/ACT inhaler 1 puff (1 puff Inhalation Not Given 12/25/20 1902)   albuterol sulfate  (90 Base) MCG/ACT inhaler 2 puff (has no administration in time range)   cloNIDine (CATAPRES) tablet 0.1 mg (has no administration in time range)   doxycycline (VIBRAMYCIN) 100 mg in dextrose 5 % 100 mL IVPB (100 mg Intravenous New Bag 12/26/20 0249)   0.9 % sodium chloride infusion ( Intravenous New Bag 12/25/20 1701)   meropenem (MERREM) 1 g in sterile water 20 mL IV syringe (0 g Intravenous Stop Time 12/25/20 0130)   vancomycin (VANCOCIN) 1,750 mg in dextrose 5 % 500 mL IVPB (0 mg Intravenous Stopped 12/24/20 2051)   dexamethasone (PF) (DECADRON) injection 6 mg (6 mg Intravenous Given 12/24/20 1810)   0.9 % sodium chloride bolus (0 mLs Intravenous Stopped 12/24/20 1913)     Patient has worsening respiratory failure from COVID. He has multifocal pneumonia. Sepsis bundle initiated. He is in atrial fibrillation with a rapid ventricular response. He initially did not slow down with IVF, therefore cardizem bolus and drip ordered.   HCAP antibiotics started. Patient at this point requires admission. I discussed the case with Dr. Ike Saha from the hospitalist service. He accepted admission. I spoke with Dr. Ike Saha. We thoroughly discussed the history, physical exam, laboratory and imaging studies, as well as, emergency department course. Based upon that discussion, we've decided to admit Seth Montoya for further observation and evaluation of Angel Scherer's dyspnea. As I have deemed necessary from their history, physical, and studies, I have considered and evaluated Seth Montoya for the following diagnoses:  ACUTE CORONARY SYNDROME, CHRONIC OBSTRUCTIVE PULMONARY DISEASE, CONGESTIVE HEART FAILURE, PERICARDIAL TAMPONADE, PNEUMONIA, PNEUMOTHORAX, PULMONARY EMBOLISM, SEPSIS, and THORACIC DISSECTION. Vitals:  Blood pressure 124/78, pulse 109, temperature 96.4 °F (35.8 °C), temperature source Oral, resp. rate 22, height 5' 2\" (1.575 m), weight 170 lb 12.8 oz (77.5 kg), SpO2 91 %. FINAL IMPRESSION      1. Pneumonia due to COVID-19 virus    2. Septicemia ([address]    3. Atrial fibrillation with rapid ventricular response ([address]    4. Acute respiratory failure with hypoxia (HCC)    5.  Dehydration          DISPOSITION/PLAN   DISPOSITION Admitted 12/24/2020 08:14:31 PM    (Please note that portions of this note were completed with a voice recognition program.  Efforts were made to edit the dictations but occasionally words are mis-transcribed.)    Paula Garza MD(electronically signed)              Paula Garza MD  12/26/20 1088

## 2020-12-25 NOTE — PROGRESS NOTES
RESPIRATORY THERAPY ASSESSMENT    Name:  Nate Rodriguez  Medical Record Number:  [id number]  Age: 80 y.o. Gender: male  : 1926  Today's Date:  2020  Room:  /0312-02    Assessment     Is the patient being admitted for a COPD or Asthma exacerbation? No   (If yes the patient will be seen every 4 hours for the first 24 hours and then reassessed)    Patient Admission Diagnosis      Allergies  Allergies   Allergen Reactions    Penicillins        Minimum Predicted Vital Capacity:               Actual Vital Capacity:                    Pulmonary History:COPD and CHF/Pulmonary Edema  Home Oxygen Therapy:  2 liters/min via nasal cannula  Home Respiratory Therapy:None   Current Respiratory Therapy:  Albuterol / Atrovent Q4 WA, Albuterol Q6 PRN          Respiratory Severity Index(RSI)   Patients with orders for inhalation medications, oxygen, or any therapeutic treatment modality will be placed on Respiratory Protocol. They will be assessed with the first treatment and at least every 72 hours thereafter. The following severity scale will be used to determine frequency of treatment intervention.     Smoking History: No Smoking History = 0    Social History  Social History     Tobacco Use    Smoking status: Never Smoker    Smokeless tobacco: Never Used   Substance Use Topics    Alcohol use: No     Alcohol/week: 0.0 standard drinks    Drug use: No       Recent Surgical History: None = 0  Past Surgical History  Past Surgical History:   Procedure Laterality Date    CATARACT REMOVAL      CYSTOSCOPY N/A 2020    CYSTOSCOPY, SUPRAPUBIC TUBE PLACEMENT performed by Cali Mohan MD at [address] [place] [address]      left    OTHER SURGICAL HISTORY Right 10/25/2016    excision of lesion lower lip    SHOULDER SURGERY      s/p fracture of right shoulder       Level of Consciousness: Disoriented and Uncooperative = 2    Level of Activity: Non weight bearing- transfers bed to chair only = 3    Respiratory Pattern: Regular Pattern; RR 8-20 = 0    Breath Sounds: Diminshed bilaterally and/or crackles = 2    Sputum   ,  ,    Cough: Strong, spontaneous, non-productive = 0    Vital Signs   BP (!) 160/96   Pulse 100   Temp 97.9 °F (36.6 °C) (Axillary)   Resp 22   Ht 5' 2\" (1.575 m)   Wt 169 lb 9.6 oz (76.9 kg)   SpO2 94%   BMI 31.02 kg/m²   SPO2 (COPD values may differ): 86-87% on room air or greater than 92% on FiO2 35- 50% = 3    Peak Flow (asthma only): not applicable = 0    RSI: 3-10 = TID (three times daily) and Q4hr PRN for dyspnea        Plan       Goals: medication delivery, mobilize retained secretions, volume expansion and improve oxygenation    Patient/caregiver was educated on the proper method of use for Respiratory Care Devices:  Yes      Level of patient/caregiver understanding able to:   ? Verbalize understanding   ? Demonstrate understanding       ? Teach back        ? Needs reinforcement       ? No available caregiver               ? Other:     Response to education:  Mariana Frederick     Is patient being placed on Home Treatment Regimen? No     Does the patient have everything they need prior to discharge? NA     Comments: pt assessed & chart reviewed    Plan of Care: Combivent 1 puff TID, Albuterol Q4 PRN    Electronically signed by Shahana Celis RCP on 12/24/2020 at 11:43 PM    Respiratory Protocol Guidelines     1. Assessment and treatment by Respiratory Therapy will be initiated for medication and therapeutic interventions upon initiation of aerosolized medication. 2. Physician will be contacted for respiratory rate (RR) greater than 35 breaths per minute. Therapy will be held for heart rate (HR) greater than 140 beats per minute, pending direction from physician. 3. Bronchodilators will be administered via Metered Dose Inhaler (MDI) with spacer when the following criteria are met:  a.  Alert and cooperative     b. HR < 140 bpm  c. RR < 30 bpm                d. Can demonstrate a 2-3 second inspiratory hold  4. Bronchodilators will be administered via Hand Held Nebulizer [facility]) to patients when ANY of the following criteria are met  a. Incognizant or uncooperative          b. Patients treated with HHN at Home        c. Unable to demonstrate proper use of MDI with spacer     d. RR > 30 bpm   5. Bronchodilators will be delivered via Metered Dose Inhaler (MDI), HHN, Aerogen to intubated patients on mechanical ventilation. 6. Inhalation medication orders will be delivered and/or substituted as outlined below. Aerosolized Medications Ordering and Administration Guidelines:    1. All Medications will be ordered by a physician, and their frequency and/or modality will be adjusted as defined by the patients Respiratory Severity Index (RSI) score. 2. If the patient does not have documented COPD, consider discontinuing anticholinergics when RSI is less than 9.  3. If the bronchospasm worsens (increased RSI), then the bronchodilator frequency can be increased to a maximum of every 4 hours. If greater than every 4 hours is required, the physician will be contacted. 4. If the bronchospasm improves, the frequency of the bronchodilator can be decreased, based on the patient's RSI, but not less than home treatment regimen frequency. 5. Bronchodilator(s) will be discontinued if patient has a RSI less than 9 and has received no scheduled or as needed treatment for 72  Hrs. Patients Ordered on a Mucolytic Agent:    1. Must always be administered with a bronchodilator. 2. Discontinue if patient experiences worsened bronchospasm, or secretions have lessened to the point that the patient is able to clear them with a cough. Anti-inflammatory and Combination Medications:    1. If the patient lacks prior history of lung disease, is not using inhaled anti-inflammatory medication at home, and lacks wheezing by examination or by history for at least 24 hours, contact physician for possible discontinuation.

## 2020-12-25 NOTE — PROGRESS NOTES
Call from [place], pt had displaced face mask and had SpO2 89%. MD was in room when I entered and had put pt's face mask back on. SpO2 now 95%. Pt is agitated currently. ROM complete.

## 2020-12-25 NOTE — PROGRESS NOTES
Consent given by phone from pt son/poa, verified and signed by two nurses, consent placed in soft chart.

## 2020-12-25 NOTE — ED NOTES
Pt remains to be noncompliant with O2. Nurse keeps educating pt on the importance of using O2 and replacing non-re breather back on pt for O2 sat dropping to 80's . Will continue to monitor.       Farzana Figueroa RN  12/24/20 [id number]

## 2020-12-25 NOTE — PROGRESS NOTES
equal, round, and reactive to light. Conjunctivae/corneas clear. Neck: Supple, with full range of motion. No jugular venous distention. Trachea midline. Respiratory:  Diminished, fairly clear otherwise. Cardiovascular: Regular rate and rhythm with normal S1/S2 without murmurs, rubs or gallops. Abdomen: Soft, non-tender, non-distended with normal bowel sounds. Musculoskeletal: No clubbing, cyanosis or edema bilaterally. Full range of motion without deformity. Skin: Skin color, texture, turgor normal.  No rashes or lesions. Neurologic:  Pt unable to follow instruction for neuro exam.   Psychiatric: Alert and disoriented, minimally verbal.   Capillary Refill: Brisk,< 3 seconds   Peripheral Pulses: +2 palpable, equal bilaterally       Labs:   Recent Labs     12/24/20  1813 12/25/20  0425   WBC 9.2 7.8   HGB 12.9* 12.6*   HCT 41.2 39.9*    309     Recent Labs     12/24/20  1734 12/25/20  0425    143   K 4.6 4.7    109   CO2 25 19*   BUN 36* 37*   CREATININE 1.2 1.1   CALCIUM 9.8 9.1     Recent Labs     12/24/20  1734 12/25/20  0425   * 103*   * 106*   BILITOT 0.6 0.4   ALKPHOS 134* 120     Recent Labs     12/24/20  1734   INR 2.55*     Recent Labs     12/24/20  1734 12/24/20  2249 12/25/20  0425   TROPONINI 0.03* 0.02* 0.01       Urinalysis:      Lab Results   Component Value Date    NITRU Negative 06/03/2017    WBCUA 0-2 03/09/2015    BACTERIA 4+ 03/07/2015    RBCUA 0-2 03/09/2015    BLOODU Negative 06/03/2017    SPECGRAV 1.010 06/03/2017    GLUCOSEU Negative 06/03/2017       Radiology:  XR CHEST PORTABLE   Final Result   Dense bilateral pulmonary opacities relatively sparing the lung apices,   compatible with sequelae of COVID-19 pneumonia with the provided clinical   history. Probable small bilateral pleural effusions.                  Assessment/Plan:    Active Hospital Problems    Diagnosis    Acute hypoxemic respiratory failure ([facility] [address] [J96.01]    Pneumonia due to COVID-19 virus [U07.1, J12.89]    Atrial fibrillation with RVR (HCC) [I48.91]    Transaminitis [R74.01]    Lactic acidosis [E87.2]    Acute on chronic respiratory failure with hypoxia ([facility]) [J96.21]       Sepsis - likely related to COVID PNA +/- bacterial HCAP. IV vanco and Merrem. Clinical concern for aspiration. Acute on chronic respiratory failure with hypoxia  Requires Ventury FiO2 50%. As above. COVID PNA,   Dx on 12/14 at [facility]. Decadron,   remdesivir - end 12/29  CCP  VitD  Eliquis. A fib w/ RVR, to 160's. On cardizem gtt. HR better. Lactic acidosis, 2.4 x2, IVF and repeats ordered. Indeterminate troponin, 0.02, tele and repeats ordered. Likely related to DS mismatch and/or CKD. Elevated LFT's, mild - see above. IVF, monitor. DM2, controlled, initial , has not been taking PO. Hold oral Rx, chk A1c, add Low SSI. Dysphagia. Failed bedside swallow eval.   Keep NPO. SLP eval.   Resume IVF. DVT Prophylaxis: on eliquis. Diet: Diet NPO Effective Now Exceptions are: Ice Chips, Sips with Meds  Code Status: DNR-CC      Dispo - PCU. Grim prognosis.      Kirti Robles MD

## 2020-12-25 NOTE — PROGRESS NOTES
Patient admitted to room 312 from ED. Patient oriented to room, call light, bed rails, phone, lights and bathroom. Patient instructed about the schedule of the day including: vital sign frequency, lab draws, possible tests, frequency of MD and staff rounds, daily weights, I &O's and prescribed diet. bed alarm in place, patient aware of placement and reason. Telemetry box in place, patient aware of placement and reason. Bed locked, in lowest position, side rails up 2/4, call light within reach. Recliner Assessment  Patient is not able to demonstrated the ability to move from a reclining position to an upright position within the recliner. Patient is confused, demented and /or unable to follow instruction. 4 Eyes Skin Assessment     The patient is being assess for   Admission    I agree that 2 RN's have performed a thorough Head to Toe Skin Assessment on the patient. ALL assessment sites listed below have been assessed. Areas assessed by both nurses:   [x]   Head, Face, and Ears   [x]   Shoulders, Back, and Chest, Abdomen  [x]   Arms, Elbows, and Hands   [x]   Coccyx, Sacrum, and Ischium  []   Legs, Feet, and Heels        Pt has dry flaky skin, especially on feet/heels. Small blanchable abrasion to L hip area, and closed, dried scab to lower R buttock, hard to see all of patients skin as patient would not stay turned over for RNs to assess all of his skin. **SHARE this note so that the co-signing nurse is able to place an eSignature**    Co-signer eSignature: {Esignature:[id number]}    Does the Patient have Skin Breakdown?   No          Andrew Prevention initiated:  NA   Wound Care Orders initiated:  NA      [facility] nurse consulted for Pressure Injury (Stage 3,4, Unstageable, DTI, NWPT, Complex wounds)and New or Established Ostomies:  NA      Primary Nurse eSignature: Electronically signed by Gina Hall RN on 12/25/20 at 1:26 AM EST

## 2020-12-25 NOTE — H&P
Hospital Medicine History & Physical      PCP: Hamida Pelaez MD    Date of Service: Pt seen/examined on 12/24/20 and admitted on 12/24/20 to Inpatient    Chief Complaint   Patient presents with    Positive For Covid-19     pt arrived via EMS from [place] in [facility], tested  [place] + on 12/14 and has worsening pneomonia, was 86% RA placed on non-rebreather and is @ 100% pt has worsening of COVID pneumonia and is congested t/o lung bases with non prod cough, pt has hx of CHF and COPD as well as Alzheimers disease       History Of Present Illness: The patient is a 80 y.o. male with PMH below, presented to [facility] from [place] SNF with decreased PO intake, increased O2 demand, cough, known COVID dx 12/14. He has hx of dementia and is not able to provide hx. SNF reported to ER that pt has had increasing O2 demand. He was previously on 2L but not clear if he has always been on that or it has just been since COVID. He has been requiring NRB in the ED. No PO intake for last few days.       Past Medical History:        Diagnosis Date    AD (Alzheimer's disease) ([facility] [address]     Allergic rhinitis     Anxiety     Ataxia     BPH with obstruction/lower urinary tract symptoms     CAD (coronary artery disease)     CHF (congestive heart failure) (HCC)     diastolic    Chronic back pain     CKD (chronic kidney disease)     Diabetes mellitus (HCC)     GERD (gastroesophageal reflux disease)     gerd    [place] (hard of hearing)     Hyperlipidemia     Hypertension     Insomnia     Lumbar disc disease     Lumbar spinal stenosis     mild    Pneumonia     Skin cancer     malignant       Past Surgical History:        Procedure Laterality Date    CATARACT REMOVAL      CYSTOSCOPY N/A 11/13/2020    CYSTOSCOPY, SUPRAPUBIC TUBE PLACEMENT performed by Glen Fernandez MD at [facility]      left    OTHER SURGICAL HISTORY Right 10/25/2016    excision of lesion lower lip    SHOULDER SURGERY      s/p fracture of right tablet Take 81 mg by mouth daily. Historical Provider, MD   acetaminophen (TYLENOL) 325 MG tablet Take 650 mg by mouth every 6 hours as needed for Pain 2 tablets at bedtime    Historical Provider, MD       Allergies:  Penicillins    Social History:    TOBACCO:   reports that he has never smoked. He has never used smokeless tobacco.  ETOH:   reports no history of alcohol use. Family History:  Reviewed in detail and negative for DM, Early CAD, Cancer (except as below). Positive as follows:        Problem Relation Age of Onset    Arthritis Mother     Diabetes Mother     Stroke Mother     Arthritis Father     Stroke Father        REVIEW OF SYSTEMS:   Unable to obtain 2/2 dementia    PHYSICAL EXAM PERFORMED:    BP (!) 123/101   Pulse 125   Temp 98.1 °F (36.7 °C) (Oral)   Resp 24   Ht 5' 2\" (1.575 m)   Wt 180 lb (81.6 kg)   SpO2 100%   BMI 32.92 kg/m²     GEN:  Somnolent, increased WOB. Appears acutely and chronically ill. HEENT:  NC/AT,EOMI, dry MM, no erythema/exudates or visible masses. CVS:  Normal S1,S2. Tachy ii. Without M/G/R.   LUNG:   Diminished bilat. no wheezes, rales or rhonchi. Tachypnea  ABD:  Soft, ND/NT, BS+ x4. Without G/R.  EXT: 2+ pulses, no c/c. Edema BLE. Brisk cap refill. PSY:  Thought process confused, affect somnolnet. TAPAN:  Does not follow commands but grossly: CN III-XII intact, moves all 4 spontaneously, sensory grossly intact. SKIN: No rash or lesions on visible skin. Chart review shows recent radiographs:  Xr Chest Portable    Result Date: 12/24/2020  EXAMINATION: ONE XRAY VIEW OF THE CHEST 12/24/2020 2:43 pm COMPARISON: 06/03/2017, 03/07/2015, 12/19/2013 HISTORY: ORDERING SYSTEM PROVIDED HISTORY: COVID pneumonia TECHNOLOGIST PROVIDED HISTORY: Reason for exam:->COVID pneumonia Reason for Exam: COVID pneumonia Acuity: Acute Type of Exam: Initial FINDINGS: There are dense bilateral pulmonary opacities with relative sparing of the lung apices.   Probable small associated bilateral pleural effusions. Cardiomediastinal contours are difficult to assess due to the overlying pulmonary opacification. The bones are diffusely demineralized without discrete acute abnormality. Dense bilateral pulmonary opacities relatively sparing the lung apices, compatible with sequelae of COVID-19 pneumonia with the provided clinical history. Probable small bilateral pleural effusions. EKG 12 lead [[id number]]    Collected: 12/24/20 1829    Updated: 12/24/20 1853     Ventricular Rate 131 BPM    Atrial Rate 101 BPM    QRS Duration 116 ms    Q-T Interval 384 ms    QTc Calculation (Bazett) 567 ms    R Axis 74 degrees    T Axis 241 degrees    Diagnosis Undetermined rhythmMarked ST abnormality, possible lateral subendocardial injuryAbnormal ECGNo previous ECGs available     CBC:  Recent Labs     12/24/20  1813   WBC 9.2   HGB 12.9*   HCT 41.2           RENAL  Recent Labs     12/24/20  1734      K 4.6      CO2 25   BUN 36*   CREATININE 1.2   GLUCOSE 112*       Hemoglobin a1c:  Lab Results   Component Value Date    LABA1C 8.8 06/04/2017    LABA1C 6.0 05/26/2011       LFT'S:  Recent Labs     12/24/20  1734   *   *   BILITOT 0.6   ALKPHOS 134*       COAG:  Recent Labs     12/24/20  1734   INR 2.55*       CARDIAC ENZYMES:   Recent Labs     12/24/20  1734   TROPONINI 0.03*     Lab Results   Component Value Date    PROBNP 351 06/03/2017    PROBNP 274 03/09/2015    PROBNP 214 03/07/2015       LACTIC ACID:  Recent Labs     12/24/20  1734   LACTA 2.4*   LACTSEPSIS 2.4*       U/A:  ordered    PHYSICIAN CERTIFICATION  I certify that Fco Cazares is expected to be hospitalized for 2 midnights based on the following assessment and plan:    ASSESSMENT/PLAN:  1. Sepsis (HR, RR, + lact), likely related to COVID PNA +/- bacterial HCAP. IV vanco and Merrem. Chk resp cx if able. F/u bld Cx. UA pending. No need for pressors at this time.      2. Acute on chronic respiratory

## 2020-12-25 NOTE — CONSULTS
[address]  Cardiac Consult     Referring Provider:  Ivy Dunn MD         History of Present Illness:    79 y/o male with h/o afib admitted with respiratory failure likely secondary to COVID pneumonia who we were asked to see for afib and elevated troponin. He has chronic afib and is on anticoagulation at home. He is very debilitated in a rest home and has dementia. He cannot give a real history. Very agitated overnight per nurse and takes non re breather off. In restraints. Calmer this morning. On iv cardizem for fast afib. HR now running 110s. Past Medical History:   has a past medical history of AD (Alzheimer's disease) ([address] Allergic rhinitis, Anxiety, Ataxia, BPH with obstruction/lower urinary tract symptoms, CAD (coronary artery disease), CHF (congestive heart failure) ([address] Chronic back pain, CKD (chronic kidney disease), Diabetes mellitus ([address] GERD (gastroesophageal reflux disease), [place] (hard of hearing), Hyperlipidemia, Hypertension, Insomnia, Lumbar disc disease, Lumbar spinal stenosis, Pneumonia, and Skin cancer. Surgical History:   has a past surgical history that includes knee surgery; shoulder surgery; Cataract removal; other surgical history (Right, 10/25/2016); and Cystoscopy (N/A, 11/13/2020). Social History:   reports that he has never smoked. He has never used smokeless tobacco. He reports that he does not drink alcohol or use drugs. Family History:  family history includes Arthritis in his father and mother; Diabetes in his mother; Stroke in his father and mother.      Medications:   albuterol-ipratropium  1 puff Inhalation TID    vancomycin (VANCOCIN) intermittent dosing (placeholder)   Other RX Placeholder    meropenem  1 g Intravenous Q12H    aspirin  81 mg Oral Daily    isosorbide mononitrate  15 mg Oral Daily    mirtazapine  7.5 mg Oral Nightly    pantoprazole  40 mg Oral Nightly    pravastatin  20 mg Oral Daily    polyethylene glycol  17 g Oral Daily    sertraline  25 mg Oral Daily    tamsulosin  0.4 mg Oral Nightly    sodium chloride flush  10 mL Intravenous 2 times per day    apixaban  2.5 mg Oral BID    insulin lispro  0-6 Units Subcutaneous Q6H    dexamethasone  6 mg Oral Daily with breakfast    Vitamin D  2,000 Units Oral Daily    remdesivir IVPB  100 mg Intravenous Q24H         Allergies:  Penicillins     ? Medications and dosages reviewed. ROS:  ?Full ROS obtained and negative except as mentioned in HPI      Physical Examination:    Vitals:    12/25/20 0548   BP:    Pulse: 117   Resp: 22   Temp:    SpO2:         Due to the current efforts to prevent transmission of COVID-19 and also the need to preserve PPE for other caregivers, a face-to-face encounter with the patient was not performed. That being said, all relevant records and diagnostic tests were reviewed, including laboratory results and imaging. Please reference any relevant documentation elsewhere. Care will be coordinated with the primary service. ·     All testing and labs listed below were personally reviewed. STRESS TEST: 3/13/2015  1. Mild reversibility in the inferoseptal wall and septal margin   of the apex is suggestive of mild ischemia in the appropriate   clinical setting. 2. Left ventricular ejection fraction is approximately 59%.      CT Abd/pelvis  1. No acute findings in the abdomen or pelvis. 2. Thickening of the wall of the esophagus with a small hiatal    hernia suggests reflux esophagitis. 3. Coronary artery disease.       Cath: 4/8/15  Findings:   LM- luminals  LAD- mild disease <15%  LCX- mild disease <15%  RCA: dominant, large ectatic, LATOSHA-1-2 flow. Sluggish dye clearance  LVEDP 20-25  LVEF 65%  A&P  1. Nonobstructive CAD  2. Cardiac Syndrome X/Endothelial dysfunction- cont anti-anginal medicine, Increase Imdur to 60mg, aggressive cholesterol control.    3. If CP continued will consider starting L-argine  4.  Elevated LVEDP: afterload/BP control, consider gentle diuretic as outpt. CXR  Impression:  Dense bilateral pulmonary opacities relatively sparing the lung apices,   compatible with sequelae of COVID-19 pneumonia with the provided clinical   history. Probable small bilateral pleural effusions       LABS  INR2. 55High   Calcium9.8mg/dL Total Protein7.3g/dL Alb3.5g/dL Albumin/Globulin Ratio0. 9Low Total Bilirubin0.6mg/dL Alkaline Csbxljghhnp290Kcmo U/L FZY435Ajfj U/L OTE130Mrnn U/L Globulin3.8g/dL   Kpdvdr262aqwh/L Potassium reflex Magnesium4.6mmol/L Bkxgzrlt713nifi/L HJ954ofad/L Anion Gap11 Hgbnotq218Eblj mg/dL MAG71Pwdg mg/dL CREATININE1.2mg/dL GFR Non- Kpcsuyxv04Waahxjze   Pro-BNP29,841High pg/mL   Lactic Acid, Sepsis3. 2High mmol/L   pH, Ven7.301Low pCO2, Ven50.5High mmHg pO2, Ven61. 3High mmHg HCO3, Rbtwhe47.3mmol/L   Specimen Source: Blood WBC7.8K/uL RBC4.51M/uL Gcxjhhnaxx65.6Low g/dL Uathodymry19.9Low % MCV88. 5fL MCH27.9pg MCHC31.5g/dL RDW18.0High % Jydadqtoe817C/uL   Results for Lencho Gudino (MRN [id number]) as of 12/25/2020 06:26   Ref. Range 12/24/2020 17:34 12/24/2020 22:49   Troponin Latest Ref Range: <0.01 ng/mL 0.03 (H) 0.02 (H)       EKG: (images reviewed)afib, PVC, Non specific ST changes    Assessment:     Respiratory Failure:  COVID pneumonia. Very ill. Continue supportive care    Afib:  Chronic on anticoagulation  Rate on fast side but very ill. Discussed with nurse. I would like to keep HR < 120  Increase cardizem if needed.     Plan:    Very ill  Agree with plan  Titrate cardizem to keep HR <120    Thank you for allowing me to participate in the care of this individual.      Sera Cai M.D., [facility]

## 2020-12-25 NOTE — PROGRESS NOTES
Pt continues to pull off venturi mask, desats into 80s, turns purple/blue. Refuses to keep it on, hits staff when they try to put mask back on. Pt placed in bilat soft wrist restraints.

## 2020-12-25 NOTE — ED NOTES
trialed taking pt off of non-rebreather and placeing pt back on NC and pt is being non-compliant with NC. Pt is taking NC out of his nose constantly and dropping to 80's every time. Nurse educated pt on importance of O2 with no success. Pt remains to be non-compliant. Pt seems to do better with non-re breather mask.  Pt placed back on non-re breather      Haydee Reaves RN  12/24/20 Darek Zeng RN  12/24/20 [id number]

## 2020-12-25 NOTE — CONSULTS
Pharmacy Note  Vancomycin Consult    Yaa Oliveros is a 80 y.o., COVID-19 positive, male from the [facility], started on Vancomycin for gram positive coverage of HCAP; consult received from Dr. Yandel Lay to manage therapy. Also receiving the following antibiotics: Merrem. Patient Active Problem List   Diagnosis    CAD (coronary artery disease)    Weakness    HTN (hypertension)    Ataxia    Patient overweight    Benign prostatic hyperplasia with urinary obstruction    Insomnia    Anxiety    Chronic back pain    Hypotension    ARF (acute renal failure) (HCC)    Abdominal distension    SOB (shortness of breath)    NSVT (nonsustained ventricular tachycardia) (HCC)    Unstable angina (HCC)    Diastolic dysfunction    Chest pain    Chronic diastolic congestive heart failure ([facility] [address]    Acute on chronic respiratory failure with hypoxia ([facility])       Allergies:  Penicillins     Temp max: 98.1    Recent Labs     12/24/20  1734   BUN 36*       Recent Labs     12/24/20  1734   CREATININE 1.2       Recent Labs     12/24/20  1813   WBC 9.2       No intake or output data in the 24 hours ending 12/25/20 0430    Culture Date      Source                       Results    12/14                                                     COVID-19    Ht Readings from Last 1 Encounters:   12/24/20 5' 2\" (1.575 m)        Wt Readings from Last 1 Encounters:   12/24/20 169 lb 9.6 oz (76.9 kg)         Body mass index is 31.02 kg/m². Estimated Creatinine Clearance: 34 mL/min (based on SCr of 1.2 mg/dL). Goal Trough Level: 15-19 mcg/mL    Assessment/Plan:  A one time loading dose of 1750 mg was given in the ED 12/24 @ 1811. Due to patient's age and current renal function, Pharmacy will pulse-dose this patient's vancomycin based on results of daily random vancomycin levels. A random vancomycin level has been for 1500 12/25. Pharmacy will re-dose if level is below 18 mcg/ml. Thank you for the consult. Will continue to follow.

## 2020-12-25 NOTE — CONSULTS
Patient is being seen at the request of Holli Travis MD  for a consultation for acute respiratory failure    HISTORY OF PRESENT ILLNESS:   80years old presented from [facility] with worsening hypoxemia. Associated with cough. Decreased p.o. intake. Diagnosed with COVID-19 12/14. Underlying dementia nonverbal unable to obtain further HPI. PAST MEDICAL HISTORY:  Past Medical History:   Diagnosis Date    AD (Alzheimer's disease) ([address]     Allergic rhinitis     Anxiety     Ataxia     BPH with obstruction/lower urinary tract symptoms     CAD (coronary artery disease)     CHF (congestive heart failure) (HCC)     diastolic    Chronic back pain     CKD (chronic kidney disease)     Diabetes mellitus (HCC)     GERD (gastroesophageal reflux disease)     gerd    [place]     Hyperlipidemia     Hypertension     Insomnia     Lumbar disc disease     Lumbar spinal stenosis     mild    Pneumonia     Skin cancer     malignant     PAST SURGICAL HISTORY:  Past Surgical History:   Procedure Laterality Date    CATARACT REMOVAL      CYSTOSCOPY N/A 11/13/2020    CYSTOSCOPY, SUPRAPUBIC TUBE PLACEMENT performed by Isidra Hill MD at [facility]      left    OTHER SURGICAL HISTORY Right 10/25/2016    excision of lesion lower lip    SHOULDER SURGERY      s/p fracture of right shoulder       FAMILY HISTORY:  family history includes Arthritis in his father and mother; Diabetes in his mother; Stroke in his father and mother. SOCIAL HISTORY:   reports that he has never smoked.  He has never used smokeless tobacco.    Scheduled Meds:   albuterol-ipratropium  1 puff Inhalation TID    vancomycin (VANCOCIN) intermittent dosing (placeholder)   Other RX Placeholder    meropenem  1 g Intravenous Q12H    aspirin  81 mg Oral Daily    isosorbide mononitrate  15 mg Oral Daily    mirtazapine  7.5 mg Oral Nightly    pantoprazole  40 mg Oral Nightly    pravastatin  20 mg Oral Daily    polyethylene glycol  17 g Oral Daily    sertraline  25 mg Oral Daily    tamsulosin  0.4 mg Oral Nightly    sodium chloride flush  10 mL Intravenous 2 times per day    apixaban  2.5 mg Oral BID    insulin lispro  0-6 Units Subcutaneous Q6H    dexamethasone  6 mg Oral Daily with breakfast    Vitamin D  2,000 Units Oral Daily    remdesivir IVPB  100 mg Intravenous Q24H     Continuous Infusions:   dilTIAZem (CARDIZEM) 125 mg in dextrose 5% 125 mL infusion 5 mg/hr (12/25/20 0548)    sodium chloride      dextrose      sodium chloride 100 mL/hr at 12/24/20 2333     PRN Meds:  albuterol sulfate HFA, cloNIDine, sodium chloride, glucose, dextrose, glucagon (rDNA), dextrose, sodium chloride flush, acetaminophen **OR** acetaminophen, guaiFENesin-dextromethorphan    ALLERGIES:  Patient is allergic to penicillins. REVIEW OF SYSTEMS: Patient is with underlying dementia nonverbal and unable to obtain      PHYSICAL EXAM:  Blood pressure (!) 151/96, pulse 123, temperature 97.8 °F (36.6 °C), temperature source Axillary, resp. rate 24, height 5' 2\" (1.575 m), weight 169 lb 1 oz (76.7 kg), SpO2 93 %.' on Venturi mask 50%  Gen: + Distress. Ill-appearing  Eyes: PERRL. No sclera icterus. No conjunctival injection. ENT: No discharge. Pharynx clear. Neck: Trachea midline. No obvious mass. Resp: + Accessory muscle use. Bilateral crackles. No wheezes. + Rhonchi. No dullness on percussion. CV: Tachycardia. Irregular rhythm. No murmur or rub. No edema. GI: Non-tender. Non-distended. No hernia. Skin: Warm and dry. No nodule on exposed extremities. Lymph: No cervical LAD. No supraclavicular LAD. M/S: No cyanosis. No joint deformity. No clubbing. Neuro: Awake. Alert. Moves all four extremities.   Not following commands  Psych: Mild agitation    LABS:  CBC:   Recent Labs     12/24/20  1813 12/25/20  0425   WBC 9.2 7.8   HGB 12.9* 12.6*   HCT 41.2 39.9*   MCV 88.0 88.5    309     BMP:   Recent Labs 12/24/20  1734 12/25/20  0425    143   K 4.6 4.7    109   CO2 25 19*   BUN 36* 37*   CREATININE 1.2 1.1     LIVER PROFILE:   Recent Labs     12/24/20  1734 12/25/20  0425   * 103*   * 106*   LIPASE 16.0  --    BILITOT 0.6 0.4   ALKPHOS 134* 120     PT/INR:   Recent Labs     12/24/20  1734   PROTIME 29.9*   INR 2.55*     APTT:   Recent Labs     12/24/20  1734   APTT 32.4     UA:No results for input(s): NITRITE, COLORU, PHUR, LABCAST, WBCUA, RBCUA, MUCUS, TRICHOMONAS, YEAST, BACTERIA, CLARITYU, SPECGRAV, LEUKOCYTESUR, UROBILINOGEN, BILIRUBINUR, BLOODU, GLUCOSEU, AMORPHOUS in the last 72 hours. Invalid input(s): KETONESU  No results for input(s): PHART, VBK7DSV, PO2ART in the last 72 hours. Chest x-ray 12/24 imaging was reviewed by me and showed   Dense bilateral airspace disease    ASSESSMENT:  · Acute hypoxemic respiratory failure  · COVID-19 viral pneumonia  · A. fib with RVR  · Lactic acidosis  · Transaminitis    PLAN:  HFNC/Vapotherm for life-threatening acute hypoxemic respiratory failure and titrate to maintain SaO2 >92%  Continuous pulse oximetry  Droplet plus isolation (surgical mask, eye protection, gown, glove)  Doxycycline for now and DC vancomycin/meropenem  Blood culture and sputum culture  Nasopharyngeal swab for influenza A&B  Moved to negative pressure room in case needs aerosolized procedure  Dexamethasone 6 mg for 10 days or until discharge, whichever is shorter  Remdesivir for 5 days. Monitor LFTs, renal and CBC daily while on medication.   1 units of convalescence plasma-12/24  Eliquis  DNR CC

## 2020-12-26 NOTE — PROGRESS NOTES
Pulmonary Progress Note    CC: Respiratory failure    Subjective:   100% nonrebreather  Confused  No fever      Intake/Output Summary (Last 24 hours) at 12/26/2020 0718  Last data filed at 12/26/2020 0254  Gross per 24 hour   Intake 2364 ml   Output 300 ml   Net 2064 ml       Exam:   /78   Pulse 120   Temp 96.4 °F (35.8 °C) (Oral)   Resp 22   Ht 5' 2\" (1.575 m)   Wt 170 lb 12.8 oz (77.5 kg)   SpO2 91%   BMI 31.24 kg/m²  on 100% nonrebreather  Gen: + Distress. Ill-appearing  Eyes: PERRL. No sclera icterus. No conjunctival injection. ENT: No discharge. Pharynx clear. Neck: Trachea midline. No obvious mass. Resp: + Accessory muscle use. Bilateral crackles. No wheezes. Bilateral rhonchi. No dullness on percussion. CV: Tachycardia. Irregular rhythm. No murmur or rub. No edema. GI: Non-tender. Non-distended. No hernia. Skin: Warm and dry. No nodule on exposed extremities. Lymph: No cervical LAD. No supraclavicular LAD. M/S: No cyanosis. No joint deformity. No clubbing. Neuro: Awake. Alert. Moves all four extremities.     Psych: Mild agitation/anxiety    Scheduled Meds:   albuterol-ipratropium  1 puff Inhalation TID    doxycycline (VIBRAMYCIN) IV  100 mg Intravenous Q12H    aspirin  81 mg Oral Daily    isosorbide mononitrate  15 mg Oral Daily    mirtazapine  7.5 mg Oral Nightly    pantoprazole  40 mg Oral Nightly    pravastatin  20 mg Oral Daily    polyethylene glycol  17 g Oral Daily    sertraline  25 mg Oral Daily    tamsulosin  0.4 mg Oral Nightly    sodium chloride flush  10 mL Intravenous 2 times per day    apixaban  2.5 mg Oral BID    insulin lispro  0-6 Units Subcutaneous Q6H    dexamethasone  6 mg Oral Daily with breakfast    Vitamin D  2,000 Units Oral Daily    remdesivir IVPB  100 mg Intravenous Q24H     Continuous Infusions:   sodium chloride 75 mL/hr at 12/25/20 1701    dilTIAZem (CARDIZEM) 125 mg in dextrose 5% 125 mL infusion 10 mg/hr (12/26/20 0969)    sodium chloride      dextrose      sodium chloride 100 mL/hr at 12/24/20 2333     PRN Meds:  albuterol sulfate HFA, cloNIDine, sodium chloride, glucose, dextrose, glucagon (rDNA), dextrose, sodium chloride flush, acetaminophen **OR** acetaminophen, guaiFENesin-dextromethorphan    Labs:  CBC:   Recent Labs     12/24/20  1813 12/25/20  0425   WBC 9.2 7.8   HGB 12.9* 12.6*   HCT 41.2 39.9*   MCV 88.0 88.5    309     BMP:   Recent Labs     12/24/20  1734 12/25/20  0425    143   K 4.6 4.7    109   CO2 25 19*   BUN 36* 37*   CREATININE 1.2 1.1     LIVER PROFILE:   Recent Labs     12/24/20  1734 12/25/20  0425   * 103*   * 106*   LIPASE 16.0  --    BILITOT 0.6 0.4   ALKPHOS 134* 120     PT/INR:   Recent Labs     12/24/20  1734   PROTIME 29.9*   INR 2.55*     APTT:   Recent Labs     12/24/20  1734   APTT 32.4     UA:No results for input(s): NITRITE, COLORU, PHUR, LABCAST, WBCUA, RBCUA, MUCUS, TRICHOMONAS, YEAST, BACTERIA, CLARITYU, SPECGRAV, LEUKOCYTESUR, UROBILINOGEN, BILIRUBINUR, BLOODU, GLUCOSEU, AMORPHOUS in the last 72 hours. Invalid input(s): KETONESU  No results for input(s): PHART, MBQ0YIN, PO2ART in the last 72 hours. Cultures:   12/24 BC NGTD    Films:  Chest x-ray 12/24 imaging was reviewed by me and showed   Dense bilateral airspace disease     ASSESSMENT:  · Acute hypoxemic respiratory failure  · COVID-19 viral pneumonia  · A. fib with RVR  · Lactic acidosis  · Transaminitis     PLAN:  · HFNC/Vapotherm for life-threatening acute hypoxemic respiratory failure and titrate to maintain SaO2 >92%  · Continuous pulse oximetry  · Droplet plus isolation   · Doxycycline  day #2  · Follow-up cultures  · Dexamethasone day #3-change to IV given inability to take p.o. · Remdesivir day #3. Monitor LFTs, renal and CBC daily while on medication.   · 1 units of convalescence plasma-12/24  · Follow labs  · DC IV fluid  · Cardizem drip per cardiology  · Eliquis  · DNR CC  · D/W Pol[place] BE[employer]ERS [phone] updated her multiple good questions were answered. Patient is DNR CC and felt hospice care is appropriate given lack of significant improvement. Son will discuss with the rest of the family and consider.

## 2020-12-26 NOTE — PROGRESS NOTES
Respiratory was in room with speech to see if pt would tolerate HF NC during swallow study. Pt was at 86% on 10 L according to RT. Pt was put on non re-breather, therefore,  we will not attempt swallow study today. MD notified.

## 2020-12-26 NOTE — PROGRESS NOTES
Pt resting in bed this evening watching TV. Pt asked for drink of water. Pt informed that he is NPO and not allowed to have anything to drink until swallow evaluation is complete. Shift assessment complete and all meds given per MAR. All oral meds held for MBS. Pt now on 8L of O2 maintaining 90% on venturi mask. Bed in lowest position and call light within reach. Pt denies any further needs at this time. Will continue to monitor and assess.

## 2020-12-26 NOTE — PROGRESS NOTES
Hospitalist Progress Note      PCP: Ivy Dunn MD    Date of Admission: 12/24/2020    Chief [facility] P[facility] and hypoxia, worsening PNA. Subjective:     Pt is awake and minimally verbal.   He seems to be choking on any PO - strict NPO now. He is presently on Ventury Mask O2 50%. 12/26  pulm discussed with family - they are open to discussion of hospice palliative care - will discuss with rest of family and let us know. Pt is DNRCC with COVID and aspiration PNA and worsening hypoxia.        Medications:  Reviewed    Infusion Medications    sodium chloride 75 mL/hr at 12/26/20 1315    dilTIAZem (CARDIZEM) 125 mg in dextrose 5% 125 mL infusion 10 mg/hr (12/26/20 1100)    sodium chloride      dextrose       Scheduled Medications    dexamethasone  6 mg Intravenous Daily    albuterol-ipratropium  1 puff Inhalation TID    doxycycline (VIBRAMYCIN) IV  100 mg Intravenous Q12H    aspirin  81 mg Oral Daily    isosorbide mononitrate  15 mg Oral Daily    mirtazapine  7.5 mg Oral Nightly    pantoprazole  40 mg Oral Nightly    pravastatin  20 mg Oral Daily    polyethylene glycol  17 g Oral Daily    sertraline  25 mg Oral Daily    tamsulosin  0.4 mg Oral Nightly    sodium chloride flush  10 mL Intravenous 2 times per day    apixaban  2.5 mg Oral BID    insulin lispro  0-6 Units Subcutaneous Q6H    Vitamin D  2,000 Units Oral Daily    remdesivir IVPB  100 mg Intravenous Q24H     PRN Meds: albuterol sulfate HFA, cloNIDine, sodium chloride, glucose, dextrose, glucagon (rDNA), dextrose, sodium chloride flush, acetaminophen **OR** acetaminophen, guaiFENesin-dextromethorphan      Intake/Output Summary (Last 24 hours) at 12/26/2020 1520  Last data filed at 12/26/2020 1321  Gross per 24 hour   Intake 2364 ml   Output --   Net 2364 ml       Physical Exam Performed:    /77   Pulse 114   Temp 97.3 °F (36.3 °C) (Axillary)   Resp 24   Ht 5' 2\" (1.575 m)   Wt 170 lb 12.8 oz (77.5 kg)   SpO2 94%   BMI 31.24 kg/m²     General appearance: confused disoriented, awake, mild to mod resp distress and increased work of breathing. HEENT: Pupils equal, round, and reactive to light. Conjunctivae/corneas clear. Neck: Supple, with full range of motion. No jugular venous distention. Trachea midline. Respiratory:  Diminished, fairly clear otherwise. Cardiovascular: Regular rate and rhythm with normal S1/S2 without murmurs, rubs or gallops. Abdomen: Soft, non-tender, non-distended with normal bowel sounds. Musculoskeletal: No clubbing, cyanosis or edema bilaterally. Full range of motion without deformity. Skin: Skin color, texture, turgor normal.  No rashes or lesions.   Neurologic:  Pt unable to follow instruction for neuro exam.   Psychiatric: Alert and disoriented, minimally verbal.   Capillary Refill: Brisk,< 3 seconds   Peripheral Pulses: +2 palpable, equal bilaterally       Labs:   Recent Labs     12/24/20  1813 12/25/20  0425 12/26/20  1220   WBC 9.2 7.8 12.1*   HGB 12.9* 12.6* 11.9*   HCT 41.2 39.9* 38.8*    309 339     Recent Labs     12/24/20  1734 12/25/20  0425 12/26/20  1220    143 146*   K 4.6 4.7 3.7    109 112*   CO2 25 19* 24   BUN 36* 37* 42*   CREATININE 1.2 1.1 1.0   CALCIUM 9.8 9.1 9.4     Recent Labs     12/24/20  1734 12/25/20  0425   * 103*   * 106*   BILITOT 0.6 0.4   ALKPHOS 134* 120     Recent Labs     12/24/20  1734   INR 2.55*     Recent Labs     12/24/20  1734 12/24/20  2249 12/25/20  0425   TROPONINI 0.03* 0.02* 0.01       Urinalysis:      Lab Results   Component Value Date    NITRU Negative 06/03/2017    WBCUA 0-2 03/09/2015    BACTERIA 4+ 03/07/2015    RBCUA 0-2 03/09/2015    BLOODU Negative 06/03/2017    SPECGRAV 1.010 06/03/2017    GLUCOSEU Negative 06/03/2017       Radiology:  XR CHEST PORTABLE   Final Result   Dense bilateral pulmonary opacities relatively sparing the lung apices,   compatible with

## 2020-12-26 NOTE — PROGRESS NOTES
[facility], [place]   Progress Note  Cardiology    CC:  COVID pneumonia, Afib    HPI:  Remains ill on 50% O2. HR with fair control on iv cardizem      Medications/Labs all Reviewed    Lab Results   Component Value Date    WBC 7.8 12/25/2020    HGB 12.6 (L) 12/25/2020    HCT 39.9 (L) 12/25/2020    MCV 88.5 12/25/2020     12/25/2020     Lab Results   Component Value Date    CREATININE 1.1 12/25/2020    BUN 37 (H) 12/25/2020     12/25/2020    K 4.7 12/25/2020     12/25/2020    CO2 19 (L) 12/25/2020     Lab Results   Component Value Date    INR 2.55 (H) 12/24/2020    PROTIME 29.9 (H) 12/24/2020        Physical Examination:    /78   Pulse 114   Temp 99 °F (37.2 °C) (Axillary)   Resp 24   Ht 5' 2\" (1.575 m)   Wt 170 lb 12.8 oz (77.5 kg)   SpO2 94%   BMI 31.24 kg/m²      Due to the current efforts to prevent transmission of COVID-19 and also the need to preserve PPE for other caregivers, a face-to-face encounter with the patient was not performed. That being said, all relevant records and diagnostic tests were reviewed, including laboratory results and imaging. Please reference any relevant documentation elsewhere. Care will be coordinated with the primary service. ·       TELE (Reviewed)   Afib, HR in 100s-110s    Assessment:      Respiratory Failure:  COVID pneumonia. Remains very ill.  Continue supportive care     Afib:  Chronic on anticoagulation with eliquis    I would like to keep HR < 120  Continue iv cardizem     Plan:   As above  Cardizem for rate control          Odilon Frausto MD, 12/26/2020 8:35 AM

## 2020-12-26 NOTE — CARE COORDINATION
Case Management Assessment  Initial Evaluation      Patient Name: Karely Le  YOB: 1926  Diagnosis: Acute on chronic respiratory failure with hypoxia [facility]) [J96.21]  Date / Time: 12/24/2020  5:17 PM    Admission status/Date:  12/24/2020  Chart Reviewed: Yes      Patient Interviewed: dementia   Family Interviewed:     Hospitalization in the last 30 days:  No      Health Care Decision Maker :    Patricia Gifford (CM - must 1st enter selection under Navigator - emergency contact- Health Care Decision Maker Relationship and pick relationship)   Who do you trust or have selected to make healthcare decisions for you      Met with: Interview conducted  (bedside/phone):    Current PCP:  [address] required for SNF : Y, DAMARIS          3 night stay required - [employer]  Support Systems/Care Needs:    Transportation: EMS transportation    Meal Preparation: per [facility]: LTC at [facility] [employer]: na  Intent for return to present living arrangements: Yes   Identified Issues: NA            Durable Medical Equiptment   DME Provider: per facility  Equipment:   Walker___Cane___RTS___ BSC___Shower Chair___Hospital Bed___W/C____Other________  02 at ____Liter(s)---wears(frequency)_______ HHN ___ CPAP___ BiPap___   N/A____      Home O2 Use :  No C19 ON +VAPOTHERM     Community Service Affiliation  Dialysis:  No    · Agency:  · Location:  · Dialysis Schedule:  · Phone:   · Fax: Other Community Services: (ex:PT/OT,Mental Health,Wound Clinic, Cardio/Pul [address])    DISCHARGE PLAN: Explained Case Management role/services. CM screening and chart review. From [facility]. +C19. Now on Vapotherm. +DNR-CC.  CM following

## 2020-12-26 NOTE — PROGRESS NOTES
Speech Language Pathology  Facility/Department: [facility] PCU TELEMETRY   CLINICAL BEDSIDE SWALLOW EVALUATION    Diet Solids Recommendation: NPO  Liquid Consistency Recommendation: NPO  Recommended Form of Meds: Via alternative means of nutrition  Oral care x 3 daily    NAME: Seth Montoya  : 1926  MRN: [id number]    ADMISSION DATE: 2020  ADMITTING DIAGNOSIS: has CAD (coronary artery disease); Weakness; HTN (hypertension); Ataxia; Patient overweight; Benign prostatic hyperplasia with urinary obstruction; Insomnia; Anxiety; Chronic back pain; Hypotension; ARF (acute renal failure) ([address] Abdominal distension; SOB (shortness of breath); NSVT (nonsustained ventricular tachycardia) ([address] Unstable angina ([address] Diastolic dysfunction; Chest pain; Chronic diastolic congestive heart failure ([address] Acute on chronic respiratory failure with hypoxia ([address] Acute hypoxemic respiratory failure ([address] Pneumonia due to COVID-19 virus; Atrial fibrillation with RVR ([address] Transaminitis; and Lactic acidosis on their problem list.  ONSET DATE: 2020    Recent Chest Xray/CT of Chest: 2020     Impression   Dense bilateral pulmonary opacities relatively sparing the lung apices,   compatible with sequelae of COVID-19 pneumonia with the provided clinical   history.       Probable small bilateral pleural effusions. Date of Eval: 2020  Evaluating Therapist: Job Archuleta    Current Diet level:  Current Diet : NPO  Current Liquid Diet : NPO    Primary Complaint  Patient Complaint: Per MD H&P: \"The patient is a 80 y.o. male with PMH below, presented to [facility] from [facility] with decreased PO intake, increased O2 demand, cough, known COVID dx . He has hx of dementia and is not able to provide hx. SNF reported to ER that pt has had increasing O2 demand. He was previously on 2L but not clear if he has always been on that or it has just been since COVID. He has been requiring NRB in the ED.   No PO intake for last few days. \"    Pain:  None indicated    Reason for Referral  Fco Cazares was referred for a bedside swallow evaluation to assess the efficiency of his swallow function, identify signs and symptoms of aspiration and make recommendations regarding safe dietary consistencies, effective compensatory strategies, and safe eating environment. Impression  Dysphagia Diagnosis: Suspected needs further assessment  Dysphagia Impression : Pt not appropriate for PO intake at this time d/t increased O2 demands; will re-evaluate as appropriate     RN cleared SLP entry, initially present during evaluation. RT called to trial pt on HFNC, pt on venturi mask 10L/min. RR between 28-32/min, FiO2 in mid-upper 80s. Pt alert and responsive but non-verbal. Followed simple commands for oral motor exam. Reduced lingual, labial, and buccal strength, ROM, and coordination. Clear but weak vocal quality at baseline. SLP unable to elicit volitional cough, throat clear, or swallow from pt. Pt edentulous, gross facial symmetry. Upon RT arrival, pt FiO2 at 85-86% on venturi mask. RT reported pt unable to tolerate HFNC at this time, switched to non-breather. No PO presentations offered. Given increased O2 demands at this time, pt not appropriate for PO intake, recommend continuation of NPO. Will continue to follow pt and re-evaluate swallow as appropriate/with decrease in O2 demands. Dysphagia Outcome Severity Scale: Level 1: Severe dysphagia- NPO. Unable to tolerate any PO safely     Treatment Plan  Requires SLP Intervention: Yes  Duration/Frequency of Treatment: 3-5x/wk for LOS  D/C Recommendations: To be determined    Recommended Diet and Intervention  Diet Solids Recommendation: NPO  Liquid Consistency Recommendation: NPO  Recommended Form of Meds: Via alternative means of nutrition    Therapeutic Interventions: Oral care; Patient/Family education    Compensatory Swallowing Strategies  NPO  Oral care x 3 daily  Upright positioning as able    Treatment/Goals  Short-term Goals  Timeframe for Short-term Goals: 7 days (01/02/2021)  Long-term Goals  Timeframe for Long-term Goals: 10 days (01/05/2021)  Goal 1: The patient will tolerate least restrictive diet without overt clinical s/s penetration/aspiration. Dysphagia Goals: The patient will tolerate recommended diet without observed clinical signs of aspiration; The patient will tolerate repeat BSE when able. ;The patient/caregiver will demonstrate understanding of compensatory strategies for improved swallowing safety. General  Chart Reviewed: Yes  Subjective  Subjective: Pt seen at bedside for dysphagia evaluation  Behavior/Cognition: Alert;Lethargic;Cooperative  O2 Device: Venturi mask  Liters of Oxygen: 10 L  Communication Observation: Non-verbal  Follows Directions: Simple  Dentition: Edentulous  Patient Positioning: Upright in bed  Baseline Vocal Quality: Weak  Volitional Cough: (Unable to Assess)  Volitional Swallow: (Unable to Assess)  Prior Dysphagia History: None per EMR    Vision/Hearing  Vision  Vision: (Unable to Assess)  Hearing  Hearing: (Unable to Assess)    Oral Motor Deficits  Oral/Motor  Oral Motor: Exceptions to [facility]  Labial Strength: Reduced  Labial Coordination: Reduced  Lingual Strength: Reduced  Lingual Coordination: Reduced    Oral Phase Dysfunction  Oral Phase  Oral Phase: (Unable to Assess)     Indicators of Pharyngeal Phase Dysfunction   Pharyngeal Phase  Pharyngeal Phase: (Unable to Assess)    Prognosis  Prognosis  Prognosis for safe diet advancement: guarded  Barriers to reach goals: age;cognitive deficits  Individuals consulted  Consulted and agree with results and recommendations: RN;Patient; Other (add comment)(RT)    Education  Patient Education: Role of SLP, recommendations, POC  Patient Education Response: Needs reinforcement; No evidence of learning  Safety Devices in place: Yes  Type of devices: All fall risk precautions in place; Left in bed;Bed alarm in place;Call light within reach;Nurse notified       Therapy Time  SLP Individual Minutes  Time In: 8327  Time Out: 4604  Minutes: 12     Dysphagia Evaluation      Ivonne Perez, [address] [facility],[address] Floor Pathologist  [phone]

## 2020-12-26 NOTE — PROGRESS NOTES
Shift assessment complete- see flow sheet. Patient is Alert but disoriented. VSS. Morning medications given without difficulty. Currently on 10 L face mask, SpO2 WNL. Lung sounds with crackles and rhonchi. Increased need for O2 overnight. To have swallow study this morning in room. Will continue to monitor.

## 2020-12-26 NOTE — PLAN OF CARE
Problem: Nutrition  Intervention: Swallowing evaluation  Note: SLP completed evaluation. Please refer to notes in EMR. Intervention: Aspiration precautions  Note: SLP completed evaluation. Please refer to notes in EMR.

## 2020-12-27 NOTE — PROGRESS NOTES
Shift assessment complete- see flow sheet. Patient is unresponsive since nightshift. Unable to obtain vitals. Currently on 15L  Non rebreather. Lung sounds with crackles. Pt not in restraints this shift. Removed by nightshift. Patient denies any further needs. Call light explained and in reach. Will continue to monitor. Family has been in to see patient prior to my shift. Hospice eval scheduled at 1pm today.

## 2020-12-27 NOTE — CARE COORDINATION
INTERDISCIPLINARY PLAN OF CARE CONFERENCE    Date/Time: 12/27/2020 9:38 AM  Completed by: Jennifer Cuevas, Case Management      Patient Name:  Giovanni Aguilar  YOB: 1926  Admitting Diagnosis: Acute on chronic respiratory failure with hypoxia [facility]) [J96.21]     Admit Date/Time:  12/24/2020  5:17 PM    Chart reviewed. Interdisciplinary team contacted or reviewed plan related to patient progress and discharge plans. Disciplines included Case Management, Nursing, and Dietitian. Current Status:ongoing  PT/OT recommendation for discharge plan of care: n/a    Expected D/C Disposition: [facility] Confirmed plan with patient and/or family Yes confirmed with: (name) pt's son and D-I-L Amie Bentley and [facility]    Discharge Plan Comments: Reviewed chart. Role of discharge planner explained and patient's D-I-L [facility], and she states that she initially told Lesa Abreu with [facility] to hold off because [facility] states that they stated that they may want another hospice agency due to their visiting hours. When THOMAS spoke with [facility] this AM, she states that pt's son came to see pt in the middle of the night d/t change of status. [facility] states that she still wants [facility]. Lesa Abreu was to be here to eval pt between 1030-1100am today. Thomas called Kimberbrian Abreu back ([phone]) and informed her that family still wants [facility] and would see her for her to eval pt between 1030-1100am today to [facility] pt. Lesa Vallesamos states that due to family stating they were undecided and may want another hospice agency, she started her day a little later and is heading to another facility. THOMAS explained to Lesa Abreu that pt did have a change in status and sons came in  In the middle of the night. THOMAS advised Lesa Abreu to call [facility] ([phone]) and made sure she had the number for [facility] to call her, to let her know what time to expect Lesa Abreu to be here to eval pt, as family would still have to sign paperwork.  Lesa Abreu verbalized understanding. No further discharge needs needed or noted. Home O2 in place on admit: No  Pt informed of need to bring portable home O2 tank on day of discharge for nursing to connect prior to leaving:  Not Indicated  Verbalized agreement/Understanding:  Not Indicated    Addendum 12/27/2020 1049: Per Jean, RN, Xiao Gibson (D-I-L) called her and stated that Vaibhav Tejada with [facility] will be here around [phone] to eval pt. Maya Chante, states that pt is now unresponsive. Family is coming in to see pt. Pt is Covid positive. Addendum 12/27/2020 1440; BRENTON Pena, explained that Vaibhav Tejada with [facility] met with  Family at 0 here at hospital. Vaibhav Tejada RN, then came and spoke with CM and stated that their visiting policy is 15 minutes and only one person every 12 hours. Broderick Narciso, states that she does not think pt will tolerate the transfer. Claudene Nordmann, RN, state pt is comfortable at this time. Family declined hospice. [facility] is not partnering with pt.  spoke with Dr. Geovanni Aragon and informed him of the above and stated pt will stay and asked for comfort meds prn for pt. Dr. Geovanni Aragon agreed and will order prn and suggested pt to be placed on 2L NC instead of non-rebreather.  informed Marilyn Bobo, (charge) and BRENTON Pena. Family is at the bedside.

## 2020-12-27 NOTE — PROGRESS NOTES
Cardizem gtt stopped for now per Dr. Norman Kelley. Pt family Is gowned up and in room with pt. Will continue to monitor.

## 2020-12-27 NOTE — PROGRESS NOTES
Shift assessment complete. Pt with bilat crackles. No PO meds given d/t refusal and pt being NPO. Call light in reach. Wrist restraints checked and still in place. Will continue to monitor.

## 2020-12-27 NOTE — PROGRESS NOTES
Pt family notified of decline in condition. Spoke with Clinical, received permission to allow family to visit pt.

## 2020-12-27 NOTE — PROGRESS NOTES
12/24/20  1813 12/25/20  0425 12/26/20  1220   WBC 9.2 7.8 12.1*   HGB 12.9* 12.6* 11.9*   HCT 41.2 39.9* 38.8*   MCV 88.0 88.5 88.0    309 339     BMP:   Recent Labs     12/24/20  1734 12/25/20  0425 12/26/20  1220    143 146*   K 4.6 4.7 3.7    109 112*   CO2 25 19* 24   BUN 36* 37* 42*   CREATININE 1.2 1.1 1.0     LIVER PROFILE:   Recent Labs     12/24/20  1734 12/25/20  0425   * 103*   * 106*   LIPASE 16.0  --    BILITOT 0.6 0.4   ALKPHOS 134* 120     PT/INR:   Recent Labs     12/24/20  1734   PROTIME 29.9*   INR 2.55*     APTT:   Recent Labs     12/24/20  1734   APTT 32.4     UA:No results for input(s): NITRITE, COLORU, PHUR, LABCAST, WBCUA, RBCUA, MUCUS, TRICHOMONAS, YEAST, BACTERIA, CLARITYU, SPECGRAV, LEUKOCYTESUR, UROBILINOGEN, BILIRUBINUR, BLOODU, GLUCOSEU, AMORPHOUS in the last 72 hours. Invalid input(s): KETONESU  No results for input(s): PHART, TWI2FYL, PO2ART in the last 72 hours. Cultures:   12/24 BC NGTD    Films:  Chest x-ray 12/24 imaging was reviewed by me and showed   Dense bilateral airspace disease     ASSESSMENT:  · Acute hypoxemic respiratory failure  · COVID-19 viral pneumonia  · A. fib with RVR  · Lactic acidosis  · Transaminitis     PLAN:  · HFNC/Vapotherm for life-threatening acute hypoxemic respiratory failure and titrate to maintain SaO2 >92%  · Continuous pulse oximetry  · Droplet plus isolation   · Doxycycline  day #3  · Follow-up cultures  · Dexamethasone day #4  · DC remdesivir  · 1 units of convalescence plasma-12/24  · Eliquis  · D/W Krista BEAVERS [phone] updated him yesterday and multiple good questions were answered. · Patient is DNR CC and son is requesting hospice care.   · Will follow on the periphery and call with questions

## 2020-12-27 NOTE — PROGRESS NOTES
[address]   Progress Note  Cardiology    CC:  COVID pneumonia, Afib    HPI:  Worsening hypoxia on 15 liters. HR controlled off of iv cardizem. Being evaluated for hospice today. DNR      Medications/Labs all Reviewed    Lab Results   Component Value Date    WBC 12.1 (H) 12/26/2020    HGB 11.9 (L) 12/26/2020    HCT 38.8 (L) 12/26/2020    MCV 88.0 12/26/2020     12/26/2020     Lab Results   Component Value Date    CREATININE 1.0 12/26/2020    BUN 42 (H) 12/26/2020     (H) 12/26/2020    K 3.7 12/26/2020     (H) 12/26/2020    CO2 24 12/26/2020     Lab Results   Component Value Date    INR 2.55 (H) 12/24/2020    PROTIME 29.9 (H) 12/24/2020        Physical Examination:    BP (!) 87/55   Pulse 74   Temp 96.9 °F (36.1 °C) (Axillary)   Resp 26   Ht 5' 2\" (1.575 m)   Wt 177 lb 11.2 oz (80.6 kg)   SpO2 (!) 76%   BMI 32.50 kg/m²      Due to the current efforts to prevent transmission of COVID-19 and also the need to preserve PPE for other caregivers, a face-to-face encounter with the patient was not performed. That being said, all relevant records and diagnostic tests were reviewed, including laboratory results and imaging. Please reference any relevant documentation elsewhere. Care will be coordinated with the primary service. ·       TELE (Reviewed)   Afib, HR in 80-90's    Assessment:      Respiratory Failure:  COVID pneumonia. Appears to be worsening  Agree with hospice.  Appears terminal     Afib:  Chronic on anticoagulation with eliquis  Controlled     Plan:  Appears terminal.  Hospice eval planned  I have nothing else to add, will sign off          Jeovanny Dorsey MD, 12/27/2020 9:06 AM

## 2020-12-27 NOTE — PROGRESS NOTES
Perfect serve sent to Dr. Zion Root for pts increased O2 demands, pt sating in 76s. New orders given to slow IV fluids and give one time dose of lasix. Will continue to monitor.

## 2020-12-27 NOTE — PROGRESS NOTES
Time of death 18:26. Physician notified. Contacted [facility] at this time. Referral # [id number]. Family using [address] [facility] [place], [facility], [address] [facility]    Spoke to  home, scheduled  at 19:40.

## 2020-12-27 NOTE — PROGRESS NOTES
Spoke to clinical, Andrew Nuñez, discussed family visitation requests. It is okay to let son and his wife come to visit as long as Dr. Mik Laboy is okay with it.

## 2020-12-28 LAB — CULTURE, BLOOD 2: NORMAL

## 2020-12-28 NOTE — DISCHARGE SUMMARY
Hospital Medicine Discharge Summary    Patient ID: Evelia Fraser      Patient's PCP: Terance Rubinstein, MD    Admit Date: 2020     Discharge Date: 2020      Admitting Physician: Lokesh Kessler MD     Discharge Physician: Carson Morales MD     Discharge Diagnoses: Active Hospital Problems    Diagnosis    Acute hypoxemic respiratory failure ([facility] [J96.01]    Pneumonia due to COVID-19 virus [U07.1, J12.89]    Atrial fibrillation with RVR (HCC) [I48.91]    Transaminitis [R74.01]    Lactic acidosis [E87.2]    Acute on chronic respiratory failure with hypoxia (HCC) [J96.21]       The patient was seen and examined on day of discharge and this discharge summary is in conjunction with any daily progress note from day of discharge. Hospital Course: 81yo man [facility], advanced end stage dementia, presents form [facility] with COVID PNA, worsening hypoxia and AMS. Sepsis - likely related to COVID PNA +/- bacterial HCAP.    IV vanco and Merrem.    Clinical concern for aspiration.         Acute on chronic respiratory failure with hypoxia        COVID PNA,           A fib w/ RVR, to 160's.        Lactic acidosis, 2.4 x2, IVF and repeats ordered.         Indeterminate troponin, 0.02, tele and repeats ordered. [facility] related to DS mismatch and/or CKD.        Elevated LFT's, mild - see above.  IVF, monitor.          DM2, controlled, initial , has not been taking PO.  Hold oral Rx, chk A1c, add Low SSI.           Dysphagia. Despite med care and O2 supplement pt's condition continued to deteriorate. He was on [employer] and [place] and maxed on vapotherm. Pulm team discussed with family. Goals of care directed towards comfort. This am pt seen and examined. He is on max Vapotherm. He is unresponsive and with agonal breathing. Family notified and came to see him. Hospice involved.      Pt  at 18:26 on 4231 due to complications of COVID PNA with multiple underlying comorbid conditions. Labs: For convenience and continuity at follow-up the following most recent labs are provided:      CBC:    Lab Results   Component Value Date    WBC 12.1 2020    HGB 11.9 2020    HCT 38.8 2020     2020       Renal:    Lab Results   Component Value Date     2020    K 3.7 2020    K 4.7 2020     2020    CO2 24 2020    BUN 42 2020    CREATININE 1.0 2020    CALCIUM 9.4 2020    PHOS 2.3 2015         Significant Diagnostic Studies    Radiology:   XR CHEST PORTABLE   Final Result   Dense bilateral pulmonary opacities relatively sparing the lung apices,   compatible with sequelae of COVID-19 pneumonia with the provided clinical   history. Probable small bilateral pleural effusions. Consults:     IP CONSULT TO HOSPITALIST  IP CONSULT TO PHARMACY  IP CONSULT TO PHARMACY  IP CONSULT TO PULMONOLOGY  IP CONSULT TO CARDIOLOGY    Disposition:  [place]     Condition at Discharge: pt .      Code Status:  DNR-CC   Discharge Medications:     Discharge Medication List as of 2020  8:25 PM           Details   sertraline (ZOLOFT) 25 MG tablet Take 25 mg by mouth dailyHistorical Med      mirtazapine (REMERON) 7.5 MG tablet Take 7.5 mg by mouth nightlyHistorical Med      apixaban (ELIQUIS) 2.5 MG TABS tablet Take by mouth 2 times dailyHistorical Med      Digestive Enzymes (ACIDOLL PO) Take by mouthHistorical Med      omeprazole (PRILOSEC) 20 MG delayed release capsule Take 20 mg by mouth every eveningHistorical Med      metoprolol tartrate (LOPRESSOR) 25 MG tablet Take 1 tablet by mouth 2 times daily, Disp-60 tablet,R-3Adjust Sig      tamsulosin (FLOMAX) 0.4 MG capsule Take 0.4 mg by mouth daily 2 capsules at bedtimeHistorical Med      potassium chloride (KLOR-CON M) 10 MEQ extended release tablet Take 2 tablets by mouth daily, Disp-30 tablet, R-0NO PRINT      furosemide (LASIX) 40 MG tablet Take 1 tablet by mouth 2 times daily Take 40 mg in the am and 20 mg in the pm, Disp-60 tablet, R-0NO PRINT      pravastatin (PRAVACHOL) 20 MG tablet Take 1 tablet by mouth daily, Disp-30 tablet, R-0NO PRINT      isosorbide mononitrate (IMDUR) 30 MG extended release tablet Take 1 tablet by mouth daily, Disp-30 tablet, R-2NO PRINT      Nutritional Supplements (GLUCERNA PO) Take 8 oz by mouth 2 times dailyHistorical Med      metFORMIN (GLUCOPHAGE) 500 MG tablet Take 500 mg by mouth 2 times daily (with meals) Historical Med      polyethylene glycol (MIRALAX) powder Take 17 g by mouth daily. , Disp-238 g, R-0      aspirin 81 MG EC tablet Take 81 mg by mouth daily. acetaminophen (TYLENOL) 325 MG tablet Take 650 mg by mouth every 6 hours as needed for Pain 2 tablets at bedtime             Time Spent on discharge is more than 30 minutes in the examination, evaluation, counseling and review of medications and discharge plan. Signed:    Gavin Ng MD   12/27/2020      Thank you Ivy Dunn MD for the opportunity to be involved in this patient's care. If you have any questions or concerns please feel free to contact me at [phone].

## 2020-12-28 NOTE — PROGRESS NOTES
Required reporting for death within 24hrs of removal of 2 point soft wrist restraints completed-added to facility database.

## 2020-12-29 LAB — BLOOD CULTURE, ROUTINE: NORMAL

## 2023-06-23 NOTE — OP NOTE
[facility]                                OPERATIVE REPORT    PATIENT NAME: RENA Shaver                        :        1926  MED REC NO:   [id number]                          ROOM:  ACCOUNT NO:   [de-identified]                           ADMIT DATE: 2020  PROVIDER:     Donna Mckeon MD    DATE OF PROCEDURE:  2020    LOCATION:  [facility].    PREOPERATIVE DIAGNOSIS:  Urinary retention. POSTOPERATIVE DIAGNOSIS:  Urinary retention. PROCEDURE PERFORMED:  Cystoscopy with suprapubic tube placement. SURGEON:  Donna Marcus. Gabriella Mckeon MD    INDICATIONS FOR PROCEDURE:  The patient is a 80-year-old male in a  wheelchair who has BPH and has failed voiding trials. 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 The  risks and benefits of a cystoscopy and SP tube placement were discussed  with the patient and the family and they agreed to proceed. DESCRIPTION OF THE PROCEDURE:  The patient had preoperative antibiotics,  general anesthesia, was in the lithotomy position. His genitalia and  lower abdomen were prepped and draped in the usual sterile fashion. A  21-Telugu rigid cystoscope was inserted into the patient's urethra. He  does have an enlarged prostate. I looked into the bladder, filled up  the bladder. Then just over the suprapubic area, I placed a spinal  needle and was able to enter into the bladder and determine the angle. I then used a 15 blade to make approximately a 1.5-cm incision just  superior to the suprapubic bone. I then removed the scope and placed a  Lowsley device and cut down the Lowsley to deliver it through the  incision that had been made. I then opened the jaws and placed a  16-Telugu Frost in and pulled it into the bladder and urethra.   I then  looked with the scope and followed the catheter into the bladder, put 10  mL of water
show

## (undated) DEVICE — SKIN MARKER REGULAR TIP WITH RULER CAP AND LABELS: Brand: DEVON

## (undated) DEVICE — SOLUTION IRRIG 2000ML STRL H2O UROMATIC PLAS CONT USP

## (undated) DEVICE — BAG DRNGE COMB PK

## (undated) DEVICE — BLADE SURG NO15 C STL DISPOSABLE ST

## (undated) DEVICE — SYRINGE MED 10ML LUERLOCK TIP W/O SFTY DISP

## (undated) DEVICE — NEEDLE SPNL 20GA LNG YEL HUB DISP

## (undated) DEVICE — GAUZE,SPONGE,4"X4",8PLY,STRL,LF,10/TRAY: Brand: MEDLINE

## (undated) DEVICE — TRAY PREP DRY W/ PREM GLV 2 APPL 6 SPNG 2 UNDPD 1 OVERWRAP

## (undated) DEVICE — STERILE LATEX POWDER-FREE SURGICAL GLOVESWITH NITRILE AND EMOLLIENT COATINGS: Brand: PROTEXIS

## (undated) DEVICE — CYSTO: Brand: MEDLINE INDUSTRIES, INC.

## (undated) DEVICE — GAUZE,SPONGE,4"X4",16PLY,XRAY,STRL,LF: Brand: MEDLINE

## (undated) DEVICE — BAG DRNGE 2000ML ROUNDED TEARDROP W/ ANTIREFLX CHMBR DISP

## (undated) DEVICE — CATHETER URETH 16FR 5CC BLLN SIL ELASTMR F 2 W